# Patient Record
Sex: FEMALE | Race: WHITE | NOT HISPANIC OR LATINO | Employment: FULL TIME | URBAN - METROPOLITAN AREA
[De-identification: names, ages, dates, MRNs, and addresses within clinical notes are randomized per-mention and may not be internally consistent; named-entity substitution may affect disease eponyms.]

---

## 2017-01-11 ENCOUNTER — HISTORICAL (OUTPATIENT)
Dept: RADIOLOGY | Facility: HOSPITAL | Age: 47
End: 2017-01-11

## 2017-02-01 ENCOUNTER — HISTORICAL (OUTPATIENT)
Dept: RADIOLOGY | Facility: HOSPITAL | Age: 47
End: 2017-02-01

## 2017-04-11 ENCOUNTER — HISTORICAL (OUTPATIENT)
Dept: LAB | Facility: HOSPITAL | Age: 47
End: 2017-04-11

## 2018-01-10 LAB
INFLUENZA A ANTIGEN, POC: NEGATIVE
INFLUENZA B ANTIGEN, POC: NEGATIVE

## 2018-09-06 ENCOUNTER — HISTORICAL (OUTPATIENT)
Dept: LAB | Facility: HOSPITAL | Age: 48
End: 2018-09-06

## 2018-09-06 LAB
ABS NEUT (OLG): 2.81 X10(3)/MCL (ref 2.1–9.2)
ALBUMIN SERPL-MCNC: 4.4 GM/DL (ref 3.4–5)
ALBUMIN/GLOB SERPL: 1.2 RATIO (ref 1.1–2)
ALP SERPL-CCNC: 105 UNIT/L (ref 38–126)
ALT SERPL-CCNC: 44 UNIT/L (ref 12–78)
AST SERPL-CCNC: 29 UNIT/L (ref 15–37)
BASOPHILS # BLD AUTO: 0 X10(3)/MCL (ref 0–0.2)
BASOPHILS NFR BLD AUTO: 0 %
BILIRUB SERPL-MCNC: 0.4 MG/DL (ref 0.2–1)
BILIRUBIN DIRECT+TOT PNL SERPL-MCNC: 0.1 MG/DL (ref 0–0.5)
BILIRUBIN DIRECT+TOT PNL SERPL-MCNC: 0.3 MG/DL (ref 0–0.8)
BUN SERPL-MCNC: 18 MG/DL (ref 7–18)
CALCIUM SERPL-MCNC: 9.8 MG/DL (ref 8.5–10.1)
CHLORIDE SERPL-SCNC: 101 MMOL/L (ref 98–107)
CHOLEST SERPL-MCNC: 206 MG/DL (ref 0–200)
CHOLEST/HDLC SERPL: 5.6 {RATIO} (ref 0–4)
CO2 SERPL-SCNC: 30 MMOL/L (ref 21–32)
CREAT SERPL-MCNC: 0.68 MG/DL (ref 0.55–1.02)
DEPRECATED CALCIDIOL+CALCIFEROL SERPL-MC: 19.48 NG/ML (ref 30–80)
EOSINOPHIL # BLD AUTO: 0.1 X10(3)/MCL (ref 0–0.9)
EOSINOPHIL NFR BLD AUTO: 2 %
ERYTHROCYTE [DISTWIDTH] IN BLOOD BY AUTOMATED COUNT: 12.6 % (ref 11.5–17)
EST. AVERAGE GLUCOSE BLD GHB EST-MCNC: 105 MG/DL
GLOBULIN SER-MCNC: 3.7 GM/DL (ref 2.4–3.5)
GLUCOSE SERPL-MCNC: 81 MG/DL (ref 74–106)
HBA1C MFR BLD: 5.3 % (ref 4.2–6.3)
HCT VFR BLD AUTO: 44.6 % (ref 37–47)
HDLC SERPL-MCNC: 37 MG/DL (ref 35–60)
HGB BLD-MCNC: 13.9 GM/DL (ref 12–16)
LDLC SERPL CALC-MCNC: 141 MG/DL (ref 0–129)
LYMPHOCYTES # BLD AUTO: 2.2 X10(3)/MCL (ref 0.6–4.6)
LYMPHOCYTES NFR BLD AUTO: 40 %
MCH RBC QN AUTO: 30.4 PG (ref 27–31)
MCHC RBC AUTO-ENTMCNC: 31.2 GM/DL (ref 33–36)
MCV RBC AUTO: 97.6 FL (ref 80–94)
MONOCYTES # BLD AUTO: 0.3 X10(3)/MCL (ref 0.1–1.3)
MONOCYTES NFR BLD AUTO: 6 %
NEUTROPHILS # BLD AUTO: 2.81 X10(3)/MCL (ref 1.4–7.9)
NEUTROPHILS NFR BLD AUTO: 51 %
PLATELET # BLD AUTO: 187 X10(3)/MCL (ref 130–400)
PMV BLD AUTO: 12.1 FL (ref 9.4–12.4)
POTASSIUM SERPL-SCNC: 4.4 MMOL/L (ref 3.5–5.1)
PROT SERPL-MCNC: 8.1 GM/DL (ref 6.4–8.2)
RBC # BLD AUTO: 4.57 X10(6)/MCL (ref 4.2–5.4)
SODIUM SERPL-SCNC: 138 MMOL/L (ref 136–145)
TRIGL SERPL-MCNC: 138 MG/DL (ref 30–150)
TSH SERPL-ACNC: 1.8 MIU/L (ref 0.36–3.74)
VLDLC SERPL CALC-MCNC: 28 MG/DL
WBC # SPEC AUTO: 5.5 X10(3)/MCL (ref 4.5–11.5)

## 2019-09-06 ENCOUNTER — HISTORICAL (OUTPATIENT)
Dept: LAB | Facility: HOSPITAL | Age: 49
End: 2019-09-06

## 2019-09-06 LAB
ABS NEUT (OLG): 2.59 X10(3)/MCL (ref 2.1–9.2)
ALBUMIN SERPL-MCNC: 4.2 GM/DL (ref 3.4–5)
ALBUMIN/GLOB SERPL: 1.4 {RATIO}
ALP SERPL-CCNC: 95 UNIT/L (ref 38–126)
ALT SERPL-CCNC: 25 UNIT/L (ref 12–78)
AST SERPL-CCNC: 12 UNIT/L (ref 15–37)
BASOPHILS # BLD AUTO: 0 X10(3)/MCL (ref 0–0.2)
BASOPHILS NFR BLD AUTO: 1 %
BILIRUB SERPL-MCNC: 0.5 MG/DL (ref 0.2–1)
BILIRUBIN DIRECT+TOT PNL SERPL-MCNC: 0.1 MG/DL (ref 0–0.2)
BILIRUBIN DIRECT+TOT PNL SERPL-MCNC: 0.4 MG/DL (ref 0–0.8)
BUN SERPL-MCNC: 15 MG/DL (ref 7–18)
CALCIUM SERPL-MCNC: 9.9 MG/DL (ref 8.5–10.1)
CHLORIDE SERPL-SCNC: 102 MMOL/L (ref 98–107)
CHOLEST SERPL-MCNC: 184 MG/DL (ref 0–200)
CHOLEST/HDLC SERPL: 5.1 {RATIO} (ref 0–4)
CO2 SERPL-SCNC: 32 MMOL/L (ref 21–32)
CREAT SERPL-MCNC: 0.64 MG/DL (ref 0.55–1.02)
DEPRECATED CALCIDIOL+CALCIFEROL SERPL-MC: 32.59 NG/ML (ref 30–80)
EOSINOPHIL # BLD AUTO: 0.1 X10(3)/MCL (ref 0–0.9)
EOSINOPHIL NFR BLD AUTO: 2 %
ERYTHROCYTE [DISTWIDTH] IN BLOOD BY AUTOMATED COUNT: 12.4 % (ref 11.5–17)
GLOBULIN SER-MCNC: 3.1 GM/DL (ref 2.4–3.5)
GLUCOSE SERPL-MCNC: 93 MG/DL (ref 74–106)
HCT VFR BLD AUTO: 43 % (ref 37–47)
HDLC SERPL-MCNC: 36 MG/DL (ref 35–60)
HGB BLD-MCNC: 13.5 GM/DL (ref 12–16)
LDLC SERPL CALC-MCNC: 130 MG/DL (ref 0–129)
LYMPHOCYTES # BLD AUTO: 2.1 X10(3)/MCL (ref 0.6–4.6)
LYMPHOCYTES NFR BLD AUTO: 40 %
MCH RBC QN AUTO: 29.9 PG (ref 27–31)
MCHC RBC AUTO-ENTMCNC: 31.4 GM/DL (ref 33–36)
MCV RBC AUTO: 95.1 FL (ref 80–94)
MONOCYTES # BLD AUTO: 0.4 X10(3)/MCL (ref 0.1–1.3)
MONOCYTES NFR BLD AUTO: 7 %
NEUTROPHILS # BLD AUTO: 2.59 X10(3)/MCL (ref 2.1–9.2)
NEUTROPHILS NFR BLD AUTO: 50 %
PLATELET # BLD AUTO: 182 X10(3)/MCL (ref 130–400)
PMV BLD AUTO: 12.1 FL (ref 9.4–12.4)
POTASSIUM SERPL-SCNC: 4.1 MMOL/L (ref 3.5–5.1)
PROT SERPL-MCNC: 7.3 GM/DL (ref 6.4–8.2)
RBC # BLD AUTO: 4.52 X10(6)/MCL (ref 4.2–5.4)
SODIUM SERPL-SCNC: 139 MMOL/L (ref 136–145)
TRIGL SERPL-MCNC: 90 MG/DL (ref 30–150)
TSH SERPL-ACNC: 1.51 MIU/L (ref 0.36–3.74)
VLDLC SERPL CALC-MCNC: 18 MG/DL
WBC # SPEC AUTO: 5.2 X10(3)/MCL (ref 4.5–11.5)

## 2019-09-24 ENCOUNTER — HISTORICAL (OUTPATIENT)
Dept: RADIOLOGY | Facility: HOSPITAL | Age: 49
End: 2019-09-24

## 2019-10-10 ENCOUNTER — HISTORICAL (OUTPATIENT)
Dept: RADIOLOGY | Facility: HOSPITAL | Age: 49
End: 2019-10-10

## 2019-10-17 ENCOUNTER — HISTORICAL (OUTPATIENT)
Dept: RADIOLOGY | Facility: HOSPITAL | Age: 49
End: 2019-10-17

## 2020-09-28 ENCOUNTER — HISTORICAL (OUTPATIENT)
Dept: RADIOLOGY | Facility: HOSPITAL | Age: 50
End: 2020-09-28

## 2020-10-29 ENCOUNTER — HISTORICAL (OUTPATIENT)
Dept: ADMINISTRATIVE | Facility: HOSPITAL | Age: 50
End: 2020-10-29

## 2020-10-29 LAB
ABS NEUT (OLG): 2.41 X10(3)/MCL (ref 2.1–9.2)
ALBUMIN SERPL-MCNC: 4.5 GM/DL (ref 3.5–5)
ALBUMIN/GLOB SERPL: 1.5 RATIO (ref 1.1–2)
ALP SERPL-CCNC: 93 UNIT/L (ref 40–150)
ALT SERPL-CCNC: 16 UNIT/L (ref 0–55)
AST SERPL-CCNC: 14 UNIT/L (ref 5–34)
BASOPHILS # BLD AUTO: 0 X10(3)/MCL (ref 0–0.2)
BASOPHILS NFR BLD AUTO: 1 %
BILIRUB SERPL-MCNC: 0.6 MG/DL
BILIRUBIN DIRECT+TOT PNL SERPL-MCNC: 0.2 MG/DL (ref 0–0.5)
BILIRUBIN DIRECT+TOT PNL SERPL-MCNC: 0.4 MG/DL (ref 0–0.8)
BUN SERPL-MCNC: 14.6 MG/DL (ref 9.8–20.1)
CALCIUM SERPL-MCNC: 9.4 MG/DL (ref 8.4–10.2)
CHLORIDE SERPL-SCNC: 100 MMOL/L (ref 98–107)
CHOLEST SERPL-MCNC: 187 MG/DL
CHOLEST/HDLC SERPL: 4 {RATIO} (ref 0–5)
CO2 SERPL-SCNC: 29 MMOL/L (ref 22–29)
CREAT SERPL-MCNC: 0.64 MG/DL (ref 0.55–1.02)
DEPRECATED CALCIDIOL+CALCIFEROL SERPL-MC: 33.6 NG/ML (ref 6.6–49.9)
EOSINOPHIL # BLD AUTO: 0.1 X10(3)/MCL (ref 0–0.9)
EOSINOPHIL NFR BLD AUTO: 2 %
ERYTHROCYTE [DISTWIDTH] IN BLOOD BY AUTOMATED COUNT: 12.1 % (ref 11.5–17)
GLOBULIN SER-MCNC: 3 GM/DL (ref 2.4–3.5)
GLUCOSE SERPL-MCNC: 92 MG/DL (ref 74–100)
HCT VFR BLD AUTO: 41.7 % (ref 37–47)
HDLC SERPL-MCNC: 44 MG/DL (ref 35–60)
HGB BLD-MCNC: 13.1 GM/DL (ref 12–16)
LDLC SERPL CALC-MCNC: 131 MG/DL (ref 50–140)
LYMPHOCYTES # BLD AUTO: 2 X10(3)/MCL (ref 0.6–4.6)
LYMPHOCYTES NFR BLD AUTO: 41 %
MAGNESIUM SERPL-MCNC: 1.9 MG/DL (ref 1.6–2.6)
MCH RBC QN AUTO: 30.6 PG (ref 27–31)
MCHC RBC AUTO-ENTMCNC: 31.4 GM/DL (ref 33–36)
MCV RBC AUTO: 97.4 FL (ref 80–94)
MONOCYTES # BLD AUTO: 0.3 X10(3)/MCL (ref 0.1–1.3)
MONOCYTES NFR BLD AUTO: 6 %
NEUTROPHILS # BLD AUTO: 2.41 X10(3)/MCL (ref 2.1–9.2)
NEUTROPHILS NFR BLD AUTO: 50 %
PLATELET # BLD AUTO: 184 X10(3)/MCL (ref 130–400)
PMV BLD AUTO: 11.8 FL (ref 9.4–12.4)
POTASSIUM SERPL-SCNC: 3.8 MMOL/L (ref 3.5–5.1)
PROT SERPL-MCNC: 7.5 GM/DL (ref 6.4–8.3)
RBC # BLD AUTO: 4.28 X10(6)/MCL (ref 4.2–5.4)
SODIUM SERPL-SCNC: 141 MMOL/L (ref 136–145)
TRIGL SERPL-MCNC: 62 MG/DL (ref 37–140)
TSH SERPL-ACNC: 1.08 UIU/ML (ref 0.35–4.94)
VLDLC SERPL CALC-MCNC: 12 MG/DL
WBC # SPEC AUTO: 4.8 X10(3)/MCL (ref 4.5–11.5)

## 2021-01-04 ENCOUNTER — HISTORICAL (OUTPATIENT)
Dept: LAB | Facility: HOSPITAL | Age: 51
End: 2021-01-04

## 2021-01-07 ENCOUNTER — HISTORICAL (OUTPATIENT)
Dept: MEDSURG UNIT | Facility: HOSPITAL | Age: 51
End: 2021-01-07

## 2021-01-07 LAB — CRC RECOMMENDATION EXT: NORMAL

## 2021-03-29 ENCOUNTER — HISTORICAL (OUTPATIENT)
Dept: RADIOLOGY | Facility: HOSPITAL | Age: 51
End: 2021-03-29

## 2021-04-12 ENCOUNTER — HISTORICAL (OUTPATIENT)
Dept: CARDIOLOGY | Facility: HOSPITAL | Age: 51
End: 2021-04-12

## 2021-09-29 ENCOUNTER — HISTORICAL (OUTPATIENT)
Dept: RADIOLOGY | Facility: HOSPITAL | Age: 51
End: 2021-09-29

## 2021-12-31 ENCOUNTER — HISTORICAL (OUTPATIENT)
Dept: LAB | Facility: HOSPITAL | Age: 51
End: 2021-12-31

## 2021-12-31 LAB
ABS NEUT (OLG): 4.09 X10(3)/MCL (ref 2.1–9.2)
ALBUMIN SERPL-MCNC: 4 GM/DL (ref 3.5–5)
ALBUMIN/GLOB SERPL: 1.3 RATIO (ref 1.1–2)
ALP SERPL-CCNC: 92 UNIT/L (ref 40–150)
ALT SERPL-CCNC: 49 UNIT/L (ref 0–55)
APPEARANCE, UA: CLEAR
AST SERPL-CCNC: 37 UNIT/L (ref 5–34)
BASOPHILS # BLD AUTO: 0.04 X10(3)/MCL (ref 0–0.2)
BASOPHILS NFR BLD AUTO: 0.6 % (ref 0–1)
BILIRUB SERPL-MCNC: 0.5 MG/DL (ref 0.2–1.2)
BILIRUB UR QL STRIP: NEGATIVE
BILIRUBIN DIRECT+TOT PNL SERPL-MCNC: 0.2 MG/DL (ref 0–0.5)
BILIRUBIN DIRECT+TOT PNL SERPL-MCNC: 0.3 MG/DL (ref 0–0.8)
BUN SERPL-MCNC: 11 MG/DL (ref 9.8–20.1)
CALCIUM SERPL-MCNC: 9.8 MG/DL (ref 8.4–10.2)
CHLORIDE SERPL-SCNC: 104 MMOL/L (ref 98–107)
CHOLEST SERPL-MCNC: 208 MG/DL
CHOLEST/HDLC SERPL: 5 {RATIO} (ref 0–5)
CO2 SERPL-SCNC: 31 MMOL/L (ref 22–29)
COLOR UR: YELLOW
CREAT SERPL-MCNC: 0.69 MG/DL (ref 0.57–1.11)
DEPRECATED CALCIDIOL+CALCIFEROL SERPL-MC: 17.1 NG/ML (ref 30–80)
EOSINOPHIL # BLD AUTO: 0.18 X10(3)/MCL (ref 0–0.9)
EOSINOPHIL NFR BLD AUTO: 2.5 % (ref 0–6.4)
ERYTHROCYTE [DISTWIDTH] IN BLOOD BY AUTOMATED COUNT: 12.4 % (ref 11.5–17)
EST. AVERAGE GLUCOSE BLD GHB EST-MCNC: 116.9 MG/DL
GLOBULIN SER-MCNC: 3 GM/DL (ref 2.4–3.5)
GLUCOSE (UA): NEGATIVE
GLUCOSE SERPL-MCNC: 88 MG/DL (ref 74–100)
HBA1C MFR BLD: 5.7 %
HCT VFR BLD AUTO: 42.1 % (ref 37–47)
HDLC SERPL-MCNC: 39 MG/DL (ref 40–60)
HGB BLD-MCNC: 13.6 GM/DL (ref 12–16)
HGB UR QL STRIP: NEGATIVE
IMM GRANULOCYTES # BLD AUTO: 0.03 10*3/UL (ref 0–0.02)
IMM GRANULOCYTES NFR BLD AUTO: 0.4 % (ref 0–0.43)
KETONES UR QL STRIP: NEGATIVE
LDLC SERPL CALC-MCNC: 147 MG/DL (ref 50–140)
LEUKOCYTE ESTERASE UR QL STRIP: NEGATIVE
LYMPHOCYTES # BLD AUTO: 2.48 X10(3)/MCL (ref 0.6–4.6)
LYMPHOCYTES NFR BLD AUTO: 34.5 % (ref 16–44)
MCH RBC QN AUTO: 30.6 PG (ref 27–31)
MCHC RBC AUTO-ENTMCNC: 32.3 GM/DL (ref 33–36)
MCV RBC AUTO: 94.8 FL (ref 80–94)
MONOCYTES # BLD AUTO: 0.36 X10(3)/MCL (ref 0.1–1.3)
MONOCYTES NFR BLD AUTO: 5 % (ref 4–12.1)
NEUTROPHILS # BLD AUTO: 4.09 X10(3)/MCL (ref 2.1–9.2)
NEUTROPHILS NFR BLD AUTO: 57 % (ref 43–73)
NITRITE UR QL STRIP: NEGATIVE
NRBC BLD AUTO-RTO: 0 % (ref 0–0.2)
PH UR STRIP: 7 [PH] (ref 5–7)
PLATELET # BLD AUTO: 214 X10(3)/MCL (ref 130–400)
PMV BLD AUTO: 10.1 FL (ref 7.4–10.4)
POTASSIUM SERPL-SCNC: 4.1 MMOL/L (ref 3.5–5.1)
PROT SERPL-MCNC: 7 GM/DL (ref 6.4–8.3)
PROT UR QL STRIP: NEGATIVE
RBC # BLD AUTO: 4.44 X10(6)/MCL (ref 4.2–5.4)
SODIUM SERPL-SCNC: 143 MMOL/L (ref 136–145)
SP GR UR STRIP: 1.02 (ref 1–1.03)
TRIGL SERPL-MCNC: 108 MG/DL (ref 0–150)
TSH SERPL-ACNC: 1.12 UIU/ML (ref 0.35–4.94)
UROBILINOGEN UR STRIP-ACNC: NEGATIVE
VLDLC SERPL CALC-MCNC: 22 MG/DL
WBC # SPEC AUTO: 7.2 X10(3)/MCL (ref 4.5–11.5)

## 2022-04-10 ENCOUNTER — HISTORICAL (OUTPATIENT)
Dept: ADMINISTRATIVE | Facility: HOSPITAL | Age: 52
End: 2022-04-10
Payer: COMMERCIAL

## 2022-04-28 VITALS
DIASTOLIC BLOOD PRESSURE: 78 MMHG | HEIGHT: 65 IN | BODY MASS INDEX: 29.38 KG/M2 | WEIGHT: 176.38 LBS | OXYGEN SATURATION: 98 % | SYSTOLIC BLOOD PRESSURE: 120 MMHG

## 2022-06-28 ENCOUNTER — OFFICE VISIT (OUTPATIENT)
Dept: FAMILY MEDICINE | Facility: CLINIC | Age: 52
End: 2022-06-28
Payer: COMMERCIAL

## 2022-06-28 VITALS
RESPIRATION RATE: 17 BRPM | OXYGEN SATURATION: 97 % | DIASTOLIC BLOOD PRESSURE: 82 MMHG | SYSTOLIC BLOOD PRESSURE: 110 MMHG | BODY MASS INDEX: 30.26 KG/M2 | TEMPERATURE: 98 F | WEIGHT: 181.63 LBS | HEART RATE: 63 BPM

## 2022-06-28 DIAGNOSIS — E55.9 VITAMIN D DEFICIENCY: ICD-10-CM

## 2022-06-28 DIAGNOSIS — Z00.00 ANNUAL PHYSICAL EXAM: ICD-10-CM

## 2022-06-28 DIAGNOSIS — F32.A DEPRESSIVE DISORDER: Primary | ICD-10-CM

## 2022-06-28 DIAGNOSIS — E66.9 OBESITY, UNSPECIFIED CLASSIFICATION, UNSPECIFIED OBESITY TYPE, UNSPECIFIED WHETHER SERIOUS COMORBIDITY PRESENT: ICD-10-CM

## 2022-06-28 DIAGNOSIS — M79.7 FIBROMYALGIA: ICD-10-CM

## 2022-06-28 DIAGNOSIS — Z12.31 BREAST CANCER SCREENING BY MAMMOGRAM: ICD-10-CM

## 2022-06-28 PROBLEM — Z80.0 FAMILY HISTORY OF COLON CANCER: Status: ACTIVE | Noted: 2022-06-28

## 2022-06-28 PROCEDURE — 3008F BODY MASS INDEX DOCD: CPT | Mod: CPTII,,, | Performed by: FAMILY MEDICINE

## 2022-06-28 PROCEDURE — 3079F DIAST BP 80-89 MM HG: CPT | Mod: CPTII,,, | Performed by: FAMILY MEDICINE

## 2022-06-28 PROCEDURE — 3008F PR BODY MASS INDEX (BMI) DOCUMENTED: ICD-10-PCS | Mod: CPTII,,, | Performed by: FAMILY MEDICINE

## 2022-06-28 PROCEDURE — 99214 OFFICE O/P EST MOD 30 MIN: CPT | Mod: ,,, | Performed by: FAMILY MEDICINE

## 2022-06-28 PROCEDURE — 3074F PR MOST RECENT SYSTOLIC BLOOD PRESSURE < 130 MM HG: ICD-10-PCS | Mod: CPTII,,, | Performed by: FAMILY MEDICINE

## 2022-06-28 PROCEDURE — 3074F SYST BP LT 130 MM HG: CPT | Mod: CPTII,,, | Performed by: FAMILY MEDICINE

## 2022-06-28 PROCEDURE — 99214 PR OFFICE/OUTPT VISIT, EST, LEVL IV, 30-39 MIN: ICD-10-PCS | Mod: ,,, | Performed by: FAMILY MEDICINE

## 2022-06-28 PROCEDURE — 3079F PR MOST RECENT DIASTOLIC BLOOD PRESSURE 80-89 MM HG: ICD-10-PCS | Mod: CPTII,,, | Performed by: FAMILY MEDICINE

## 2022-06-28 RX ORDER — TRIAMCINOLONE ACETONIDE 1 MG/G
CREAM TOPICAL 2 TIMES DAILY
Qty: 80 G | Refills: 2 | Status: SHIPPED | OUTPATIENT
Start: 2022-06-28 | End: 2022-06-28 | Stop reason: SDUPTHER

## 2022-06-28 RX ORDER — METHYLPREDNISOLONE 4 MG/1
TABLET ORAL
Qty: 1 EACH | Refills: 0 | Status: SHIPPED | OUTPATIENT
Start: 2022-06-28 | End: 2023-01-05 | Stop reason: SDUPTHER

## 2022-06-28 RX ORDER — FLUTICASONE PROPIONATE 50 MCG
1 SPRAY, SUSPENSION (ML) NASAL 2 TIMES DAILY
COMMUNITY
Start: 2022-04-18 | End: 2022-07-18

## 2022-06-28 RX ORDER — GABAPENTIN 300 MG/1
300 CAPSULE ORAL
COMMUNITY
Start: 2022-01-19 | End: 2022-11-01 | Stop reason: SDUPTHER

## 2022-06-28 RX ORDER — TRIAMCINOLONE ACETONIDE 1 MG/G
CREAM TOPICAL 2 TIMES DAILY
Qty: 80 G | Refills: 2 | Status: SHIPPED | OUTPATIENT
Start: 2022-06-28 | End: 2023-01-05 | Stop reason: SDUPTHER

## 2022-06-28 RX ORDER — FLUOXETINE HYDROCHLORIDE 40 MG/1
40 CAPSULE ORAL DAILY
COMMUNITY
Start: 2022-03-22 | End: 2022-06-28 | Stop reason: SDUPTHER

## 2022-06-28 RX ORDER — FLUOXETINE HYDROCHLORIDE 40 MG/1
40 CAPSULE ORAL DAILY
Qty: 90 CAPSULE | Refills: 3 | Status: SHIPPED | OUTPATIENT
Start: 2022-06-28 | End: 2023-02-16 | Stop reason: SDUPTHER

## 2022-06-28 RX ORDER — ASPIRIN 325 MG
50000 TABLET, DELAYED RELEASE (ENTERIC COATED) ORAL
COMMUNITY
Start: 2022-01-03

## 2022-06-28 NOTE — PROGRESS NOTES
Subjective:        Patient ID: Lida Walker is a 51 y.o. female.    Chief Complaint: Follow-up (Prozac refill)      Patient presents to the clinic unaccompanied for follow-up.  She is due for her wellness visit in December.    She has a history of depression fibromyalgia.  She is currently on Prozac 40 mg daily.  It is working well.  She is asking for refill.  She is seeing a counselor.  She is also on gabapentin.    Her vitamin-D has been low in the past.  She is currently supplementing.    She reports a rash related to sun exposure.  She uses triamcinolone topical cream and Medrol Dosepaks which is helpful.  She does not currently have the rash.    She has had a hysterectomy due to fibroids in 2008 with Dr. Stevens in [place].  Last mammogram 9/2021 was normal. She had colon cancer screening in January 2021 with Dr. gonzalez      +family history of colon cancer in brother (Dx age 57)    Review of Systems   Constitutional: Negative.    HENT: Negative.    Respiratory: Negative.    Cardiovascular: Negative.    Gastrointestinal: Negative.    Genitourinary: Negative.          Review of patient's allergies indicates:   Allergen Reactions    Lortab [hydrocodone-acetaminophen]       Vitals:    06/28/22 0812   BP: 110/82   BP Location: Left arm   Pulse: 63   Resp: 17   Temp: 97.9 °F (36.6 °C)   SpO2: 97%   Weight: 82.4 kg (181 lb 9.6 oz)      Social History     Socioeconomic History    Marital status:    Tobacco Use    Smoking status: Never Smoker    Smokeless tobacco: Never Used   Substance and Sexual Activity    Alcohol use: Yes    Drug use: No    Sexual activity: Not Currently      History reviewed. No pertinent family history.       Objective:     Physical Exam  Vitals and nursing note reviewed.   Constitutional:       Appearance: Normal appearance. She is obese.   HENT:      Head: Normocephalic and atraumatic.      Nose: Nose normal.      Mouth/Throat:      Mouth: Mucous membranes are moist.       Pharynx: Oropharynx is clear.   Eyes:      Extraocular Movements: Extraocular movements intact.   Cardiovascular:      Rate and Rhythm: Normal rate and regular rhythm.      Pulses: Normal pulses.      Heart sounds: Normal heart sounds.   Pulmonary:      Effort: Pulmonary effort is normal.      Breath sounds: Normal breath sounds.   Musculoskeletal:         General: Normal range of motion.      Cervical back: Normal range of motion.   Skin:     General: Skin is warm and dry.   Neurological:      General: No focal deficit present.      Mental Status: She is alert and oriented to person, place, and time. Mental status is at baseline.   Psychiatric:         Mood and Affect: Mood normal.       Current Outpatient Medications on File Prior to Visit   Medication Sig Dispense Refill    gabapentin (NEURONTIN) 300 MG capsule Take 300 mg by mouth.      cholecalciferol, vitamin D3, 1,250 mcg (50,000 unit) capsule Take 50,000 Units by mouth every 7 days.      fluticasone propionate (FLONASE) 50 mcg/actuation nasal spray 1 spray by Each Nostril route 2 (two) times daily. PRN      polyethylene glycol (GLYCOLAX) 17 gram PwPk Take 17 g by mouth once daily. 10 each 0    [DISCONTINUED] fluoxetine (PROZAC) 20 MG capsule Take 1 capsule (20 mg total) by mouth once daily. 30 capsule 0    [DISCONTINUED] FLUoxetine 40 MG capsule Take 40 mg by mouth once daily.      [DISCONTINUED] gabapentin (NEURONTIN) 100 MG capsule Take 1 capsule (100 mg total) by mouth every evening. 30 capsule 3    [DISCONTINUED] triamcinolone acetonide 0.1% (KENALOG) 0.1 % cream Apply topically 2 (two) times daily. 80 g 2     No current facility-administered medications on file prior to visit.     Health Maintenance   Topic Date Due    Hepatitis C Screening  Never done    TETANUS VACCINE  Never done    Mammogram  09/29/2022    Lipid Panel  12/31/2022      Results for orders placed or performed in visit on 12/31/21   Comprehensive Metabolic Panel   Result  Value Ref Range    Albumin/Globulin Ratio 1.3 1.1 - 2.0 ratio    Alanine Aminotransferase 49 0 - 55 unit/L    Albumin Level 4.0 3.5 - 5.0 gm/dL    Alkaline Phosphatase 92 40 - 150 unit/L    Aspartate Aminotransferase 37 (H) 5 - 34 unit/L    Bilirubin Direct 0.2 0.0 - 0.5 mg/dL    Bilirubin Indirect 0.30 0.00 - 0.80 mg/dL    Bilirubin Total 0.5 0.2 - 1.2 mg/dL    Blood Urea Nitrogen 11.0 9.8 - 20.1 mg/dL    Carbon Dioxide 31 (H) 22 - 29 mmol/L    Chloride 104 98 - 107 mmol/L    Calcium Level Total 9.8 8.4 - 10.2 mg/dL    Creatinine 0.69 0.57 - 1.11 mg/dL    Glucose Level 88 74 - 100 mg/dL    Globulin 3.0 2.4 - 3.5 gm/dL    Potassium Level 4.1 3.5 - 5.1 mmol/L    Sodium Level 143 136 - 145 mmol/L    Protein Total 7.0 6.4 - 8.3 gm/dL   Lipid Panel   Result Value Ref Range    Cholesterol Total 208 (H) <<=200 mg/dL    Cholesterol/HDL Ratio 5 0 - 5    HDL Cholesterol 39 (L) 40 - 60 mg/dL    LDL Cholesterol 147.00 (H) 50.00 - 140.00 mg/dL    Triglyceride 108 0 - 150 mg/dL    Very Low Density Lipoprotein 22    Hemoglobin A1C   Result Value Ref Range    Estimated Average Glucose 116.9 mg/dL    Hemoglobin A1c 5.7 <<=7.0 %   TSH   Result Value Ref Range    Thyroid Stimulating Hormone 1.1218 0.3500 - 4.9400 uIU/mL   Vitamin D   Result Value Ref Range    Vit D 25 OH 17.1 (L) 30.0 - 80.0 ng/mL   GFR, Estimated   Result Value Ref Range    Estimated GFR-Non African American >60 mL/min/1.73 m2    Estimated GFR- >60    Differential   Result Value Ref Range    Basophil % 0.6 0.0 - 1.0 %    Neut # 4.09 2.10 - 9.20 x10(3)/mcL    Lymph # 2.48 0.60 - 4.60 x10(3)/mcL    Mono # 0.36 0.10 - 1.30 x10(3)/mcL    Eos # 0.18 0.00 - 0.90 x10(3)/mcL    Baso # 0.04 0.00 - 0.20 x10(3)/mcL    Neut % 57.0 43.0 - 73.0 %    IG# 0.0300 (H) 0.0000 - 0.0155    Lymph % 34.5 16.0 - 44.0 %    IG% 0.400 0.000 - 0.430 %    Mono % 5.0 4.0 - 12.1 %    NRBC% 0.0 0.0 - 0.2 %    Eos % 2.5 0.0 - 6.4 %   CBC Auto Differential   Result Value Ref Range     Abs Neut 4.09 2.10 - 9.20 x10(3)/mcL    Hct 42.1 37.0 - 47.0 %    MCH 30.6 27.0 - 31.0 pg    MCHC 32.3 (L) 33.0 - 36.0 gm/dL    MPV 10.1 7.4 - 10.4 fL    Hgb 13.6 12.0 - 16.0 gm/dL    MCV 94.8 (H) 80.0 - 94.0 fL    RDW 12.4 11.5 - 17.0 %    Platelet 214 130 - 400 x10(3)/mcL    WBC 7.2 4.5 - 11.5 x10(3)/mcL    RBC 4.44 4.20 - 5.40 x10(6)/mcL   Urinalysis, Reflex to Urine Culture    Specimen: Urine   Result Value Ref Range    pH, UA 7.0 5.0 - 7.0    Ketones, UA Negative >Negative    Leukocytes, UA Negative >Negative    Urobilinogen, UA Negative >Negative    Color, UA YELLOW >YELLOW    Nitrite, UA Negative >Negative    Bilirubin (UA) Negative >Negative    Appearance, UA CLEAR >CLEAR    Protein, UA Negative >Negative    Occult Blood UA Negative >Negative    Specific Gravity,UA 1.020 1.005 - 1.030    Glucose, UA Negative >Negative          Assessment & Plan:     Active Problem List with Overview Notes    Diagnosis Date Noted    Depressive disorder 06/28/2022    Breast cancer screening by mammogram 06/28/2022    Obesity 06/28/2022    Vitamin D deficiency 06/28/2022    Family history of colon cancer 06/28/2022    Fibromyalgia 04/09/2013       1. Depressive disorder  Assessment & Plan:  Stable on current meds    Orders:  -     CBC Auto Differential  -     Comprehensive Metabolic Panel  -     HIV 1/2 Ag/Ab (4th Gen)  -     Hepatitis C Antibody  -     Lipid Panel  -     TSH  -     Vitamin D    2. Obesity, unspecified classification, unspecified obesity type, unspecified whether serious comorbidity present  Assessment & Plan:  Encourage lifestyle change    Orders:  -     CBC Auto Differential  -     Comprehensive Metabolic Panel  -     HIV 1/2 Ag/Ab (4th Gen)  -     Hepatitis C Antibody  -     Lipid Panel  -     TSH  -     Vitamin D    3. Vitamin D deficiency  Assessment & Plan:  Continue to supplement otc. Will check with wellness labs    Orders:  -     CBC Auto Differential  -     Comprehensive Metabolic Panel  -      HIV 1/2 Ag/Ab (4th Gen)  -     Hepatitis C Antibody  -     Lipid Panel  -     TSH  -     Vitamin D    4. Fibromyalgia  Assessment & Plan:  Stable on current meds.     Orders:  -     CBC Auto Differential  -     Comprehensive Metabolic Panel  -     HIV 1/2 Ag/Ab (4th Gen)  -     Hepatitis C Antibody  -     Lipid Panel  -     TSH  -     Vitamin D    5. Annual physical exam  -     CBC Auto Differential  -     Comprehensive Metabolic Panel  -     HIV 1/2 Ag/Ab (4th Gen)  -     Hepatitis C Antibody  -     Lipid Panel  -     TSH  -     Vitamin D    6. Breast cancer screening by mammogram  Assessment & Plan:  Mammogram due 9/2022. Order placed    Orders:  -     Mammo Digital Screening Bilat w/ [facility]    Other orders  -     FLUoxetine 40 MG capsule  -     methylPREDNISolone (MEDROL DOSEPACK) 4 mg tablet  -     triamcinolone acetonide 0.1% (KENALOG) 0.1 % cream       Follow up in about 6 months (around 12/28/2022) for wellness with labs.

## 2022-09-21 ENCOUNTER — HISTORICAL (OUTPATIENT)
Dept: ADMINISTRATIVE | Facility: HOSPITAL | Age: 52
End: 2022-09-21
Payer: COMMERCIAL

## 2022-10-03 ENCOUNTER — HOSPITAL ENCOUNTER (OUTPATIENT)
Dept: RADIOLOGY | Facility: HOSPITAL | Age: 52
Discharge: HOME | End: 2022-10-03
Attending: FAMILY MEDICINE
Payer: COMMERCIAL

## 2022-10-03 DIAGNOSIS — Z12.31 BREAST CANCER SCREENING BY MAMMOGRAM: ICD-10-CM

## 2022-10-03 PROCEDURE — 77067 MAMMO DIGITAL SCREENING BILAT WITH TOMO: ICD-10-PCS | Mod: 26,,, | Performed by: RADIOLOGY

## 2022-10-03 PROCEDURE — 77067 SCR MAMMO BI INCL CAD: CPT | Mod: TC

## 2022-10-03 PROCEDURE — 77063 BREAST TOMOSYNTHESIS BI: CPT | Mod: 26,,, | Performed by: RADIOLOGY

## 2022-10-03 PROCEDURE — 77063 MAMMO DIGITAL SCREENING BILAT WITH TOMO: ICD-10-PCS | Mod: 26,,, | Performed by: RADIOLOGY

## 2022-10-03 PROCEDURE — 77067 SCR MAMMO BI INCL CAD: CPT | Mod: 26,,, | Performed by: RADIOLOGY

## 2022-11-01 RX ORDER — GABAPENTIN 300 MG/1
300 CAPSULE ORAL NIGHTLY
Qty: 30 CAPSULE | Refills: 8 | Status: SHIPPED | OUTPATIENT
Start: 2022-11-01 | End: 2023-07-29

## 2022-11-01 NOTE — TELEPHONE ENCOUNTER
----- Message from Sandra Herrera sent at 11/1/2022  1:52 PM CDT -----  Regarding: refill  Type:  RX Refill Request    Who Called: pt   Refill or New Rx:refill  RX Name and Strength:gabapentin (NEURONTIN) 300 MG capsule  How is the patient currently taking it? (ex. 1XDay):1 day  Is this a 30 day or 90 day RX:30  Preferred Pharmacy with phone number:[facility] [place]  Local or Mail Order:local  Ordering Provider:emerald gagnon  Would the patient rather a call back or a response via [facility]? C/b  Best Call Back Number:[phone]  Additional Information: pt is all out of medication, pharmacy contacted pt stated they sent request 2x with no response

## 2022-11-28 ENCOUNTER — DOCUMENTATION ONLY (OUTPATIENT)
Dept: ADMINISTRATIVE | Facility: HOSPITAL | Age: 52
End: 2022-11-28
Payer: COMMERCIAL

## 2023-01-02 ENCOUNTER — LAB VISIT (OUTPATIENT)
Dept: LAB | Facility: HOSPITAL | Age: 53
End: 2023-01-02
Attending: FAMILY MEDICINE
Payer: COMMERCIAL

## 2023-01-02 DIAGNOSIS — E55.9 VITAMIN D DEFICIENCY: ICD-10-CM

## 2023-01-02 DIAGNOSIS — M79.7 FIBROMYALGIA: ICD-10-CM

## 2023-01-02 DIAGNOSIS — E66.9 OBESITY, UNSPECIFIED CLASSIFICATION, UNSPECIFIED OBESITY TYPE, UNSPECIFIED WHETHER SERIOUS COMORBIDITY PRESENT: ICD-10-CM

## 2023-01-02 DIAGNOSIS — Z00.00 ANNUAL PHYSICAL EXAM: ICD-10-CM

## 2023-01-02 DIAGNOSIS — F32.A DEPRESSIVE DISORDER: ICD-10-CM

## 2023-01-02 LAB
ALBUMIN SERPL-MCNC: 4 G/DL (ref 3.5–5)
ALBUMIN/GLOB SERPL: 1.3 RATIO (ref 1.1–2)
ALP SERPL-CCNC: 87 UNIT/L (ref 40–150)
ALT SERPL-CCNC: 24 UNIT/L (ref 0–55)
AST SERPL-CCNC: 19 UNIT/L (ref 5–34)
BASOPHILS # BLD AUTO: 0.03 X10(3)/MCL (ref 0–0.2)
BASOPHILS NFR BLD AUTO: 0.6 %
BILIRUBIN DIRECT+TOT PNL SERPL-MCNC: 0.5 MG/DL
BUN SERPL-MCNC: 12.9 MG/DL (ref 9.8–20.1)
CALCIUM SERPL-MCNC: 9.4 MG/DL (ref 8.4–10.2)
CHLORIDE SERPL-SCNC: 107 MMOL/L (ref 98–107)
CHOLEST SERPL-MCNC: 203 MG/DL
CHOLEST/HDLC SERPL: 6 {RATIO} (ref 0–5)
CO2 SERPL-SCNC: 28 MMOL/L (ref 22–29)
CREAT SERPL-MCNC: 0.74 MG/DL (ref 0.55–1.02)
DEPRECATED CALCIDIOL+CALCIFEROL SERPL-MC: 29.8 NG/ML (ref 30–80)
EOSINOPHIL # BLD AUTO: 0.12 X10(3)/MCL (ref 0–0.9)
EOSINOPHIL NFR BLD AUTO: 2.6 %
ERYTHROCYTE [DISTWIDTH] IN BLOOD BY AUTOMATED COUNT: 12.8 % (ref 11–14.5)
GFR SERPLBLD CREATININE-BSD FMLA CKD-EPI: >60 MLS/MIN/1.73/M2
GLOBULIN SER-MCNC: 3.2 GM/DL (ref 2.4–3.5)
GLUCOSE SERPL-MCNC: 105 MG/DL (ref 74–100)
HCT VFR BLD AUTO: 40.9 % (ref 37–47)
HCV AB SERPL QL IA: NONREACTIVE
HDLC SERPL-MCNC: 36 MG/DL (ref 35–60)
HGB BLD-MCNC: 13.4 GM/DL (ref 12–16)
HIV 1+2 AB+HIV1 P24 AG SERPL QL IA: NONREACTIVE
IMM GRANULOCYTES # BLD AUTO: 0.01 X10(3)/MCL (ref 0–0.04)
IMM GRANULOCYTES NFR BLD AUTO: 0.2 %
LDLC SERPL CALC-MCNC: 139 MG/DL (ref 50–140)
LYMPHOCYTES # BLD AUTO: 1.53 X10(3)/MCL (ref 0.6–4.6)
LYMPHOCYTES NFR BLD AUTO: 33 %
MCH RBC QN AUTO: 30.6 PG
MCHC RBC AUTO-ENTMCNC: 32.8 MG/DL (ref 33–36)
MCV RBC AUTO: 93.4 FL (ref 80–94)
MONOCYTES # BLD AUTO: 0.32 X10(3)/MCL (ref 0.1–1.3)
MONOCYTES NFR BLD AUTO: 6.9 %
NEUTROPHILS # BLD AUTO: 2.62 X10(3)/MCL (ref 2.1–9.2)
NEUTROPHILS NFR BLD AUTO: 56.7 %
NRBC BLD AUTO-RTO: 0 % (ref 0–1)
PLATELET # BLD AUTO: 179 X10(3)/MCL (ref 140–371)
PMV BLD AUTO: 10.8 FL (ref 9.4–12.4)
POTASSIUM SERPL-SCNC: 3.8 MMOL/L (ref 3.5–5.1)
PROT SERPL-MCNC: 7.2 GM/DL (ref 6.4–8.3)
RBC # BLD AUTO: 4.38 X10(6)/MCL (ref 4.2–5.4)
SODIUM SERPL-SCNC: 141 MMOL/L (ref 136–145)
TRIGL SERPL-MCNC: 138 MG/DL (ref 37–140)
TSH SERPL-ACNC: 1.06 UIU/ML (ref 0.35–4.94)
VLDLC SERPL CALC-MCNC: 28 MG/DL
WBC # SPEC AUTO: 4.6 X10(3)/MCL (ref 4.5–11.5)

## 2023-01-02 PROCEDURE — 80053 COMPREHEN METABOLIC PANEL: CPT

## 2023-01-02 PROCEDURE — 86803 HEPATITIS C AB TEST: CPT

## 2023-01-02 PROCEDURE — 82306 VITAMIN D 25 HYDROXY: CPT

## 2023-01-02 PROCEDURE — 85025 COMPLETE CBC W/AUTO DIFF WBC: CPT

## 2023-01-02 PROCEDURE — 87389 HIV-1 AG W/HIV-1&-2 AB AG IA: CPT

## 2023-01-02 PROCEDURE — 36415 COLL VENOUS BLD VENIPUNCTURE: CPT

## 2023-01-02 PROCEDURE — 84443 ASSAY THYROID STIM HORMONE: CPT

## 2023-01-02 PROCEDURE — 80061 LIPID PANEL: CPT

## 2023-01-05 ENCOUNTER — OFFICE VISIT (OUTPATIENT)
Dept: FAMILY MEDICINE | Facility: CLINIC | Age: 53
End: 2023-01-05
Payer: COMMERCIAL

## 2023-01-05 VITALS
SYSTOLIC BLOOD PRESSURE: 122 MMHG | OXYGEN SATURATION: 97 % | TEMPERATURE: 98 F | DIASTOLIC BLOOD PRESSURE: 82 MMHG | WEIGHT: 180 LBS | HEART RATE: 64 BPM | RESPIRATION RATE: 16 BRPM | BODY MASS INDEX: 30.73 KG/M2 | HEIGHT: 64 IN

## 2023-01-05 DIAGNOSIS — E66.9 CLASS 1 OBESITY WITH BODY MASS INDEX (BMI) OF 30.0 TO 30.9 IN ADULT, UNSPECIFIED OBESITY TYPE, UNSPECIFIED WHETHER SERIOUS COMORBIDITY PRESENT: ICD-10-CM

## 2023-01-05 DIAGNOSIS — Z12.31 BREAST CANCER SCREENING BY MAMMOGRAM: ICD-10-CM

## 2023-01-05 DIAGNOSIS — R21 SKIN RASH: ICD-10-CM

## 2023-01-05 DIAGNOSIS — Z00.00 WELLNESS EXAMINATION: Primary | ICD-10-CM

## 2023-01-05 DIAGNOSIS — E55.9 VITAMIN D DEFICIENCY: ICD-10-CM

## 2023-01-05 DIAGNOSIS — G47.33 OSA ON CPAP: ICD-10-CM

## 2023-01-05 DIAGNOSIS — M79.7 FIBROMYALGIA: ICD-10-CM

## 2023-01-05 DIAGNOSIS — M19.90 ARTHRITIS: ICD-10-CM

## 2023-01-05 DIAGNOSIS — F32.A DEPRESSIVE DISORDER: ICD-10-CM

## 2023-01-05 PROCEDURE — 3074F SYST BP LT 130 MM HG: CPT | Mod: CPTII,,, | Performed by: FAMILY MEDICINE

## 2023-01-05 PROCEDURE — 1159F MED LIST DOCD IN RCRD: CPT | Mod: CPTII,,, | Performed by: FAMILY MEDICINE

## 2023-01-05 PROCEDURE — 3079F DIAST BP 80-89 MM HG: CPT | Mod: CPTII,,, | Performed by: FAMILY MEDICINE

## 2023-01-05 PROCEDURE — 3079F PR MOST RECENT DIASTOLIC BLOOD PRESSURE 80-89 MM HG: ICD-10-PCS | Mod: CPTII,,, | Performed by: FAMILY MEDICINE

## 2023-01-05 PROCEDURE — 1159F PR MEDICATION LIST DOCUMENTED IN MEDICAL RECORD: ICD-10-PCS | Mod: CPTII,,, | Performed by: FAMILY MEDICINE

## 2023-01-05 PROCEDURE — 3008F PR BODY MASS INDEX (BMI) DOCUMENTED: ICD-10-PCS | Mod: CPTII,,, | Performed by: FAMILY MEDICINE

## 2023-01-05 PROCEDURE — 1160F RVW MEDS BY RX/DR IN RCRD: CPT | Mod: CPTII,,, | Performed by: FAMILY MEDICINE

## 2023-01-05 PROCEDURE — 99396 PR PREVENTIVE VISIT,EST,40-64: ICD-10-PCS | Mod: ,,, | Performed by: FAMILY MEDICINE

## 2023-01-05 PROCEDURE — 99396 PREV VISIT EST AGE 40-64: CPT | Mod: ,,, | Performed by: FAMILY MEDICINE

## 2023-01-05 PROCEDURE — 3074F PR MOST RECENT SYSTOLIC BLOOD PRESSURE < 130 MM HG: ICD-10-PCS | Mod: CPTII,,, | Performed by: FAMILY MEDICINE

## 2023-01-05 PROCEDURE — 3008F BODY MASS INDEX DOCD: CPT | Mod: CPTII,,, | Performed by: FAMILY MEDICINE

## 2023-01-05 PROCEDURE — 1160F PR REVIEW ALL MEDS BY PRESCRIBER/CLIN PHARMACIST DOCUMENTED: ICD-10-PCS | Mod: CPTII,,, | Performed by: FAMILY MEDICINE

## 2023-01-05 RX ORDER — METHYLPREDNISOLONE 4 MG/1
TABLET ORAL
Qty: 1 EACH | Refills: 0 | Status: SHIPPED | OUTPATIENT
Start: 2023-01-05

## 2023-01-05 RX ORDER — FLUOXETINE 10 MG/1
10 CAPSULE ORAL DAILY
Qty: 90 CAPSULE | Refills: 3 | Status: SHIPPED | OUTPATIENT
Start: 2023-01-05 | End: 2023-02-16 | Stop reason: SDUPTHER

## 2023-01-05 RX ORDER — DICLOFENAC SODIUM 75 MG/1
1 TABLET, DELAYED RELEASE ORAL 2 TIMES DAILY WITH MEALS
COMMUNITY
Start: 2022-11-25 | End: 2023-01-05 | Stop reason: SDUPTHER

## 2023-01-05 RX ORDER — TRIAMCINOLONE ACETONIDE 1 MG/G
CREAM TOPICAL 2 TIMES DAILY
Qty: 80 G | Refills: 2 | Status: SHIPPED | OUTPATIENT
Start: 2023-01-05 | End: 2024-10-19

## 2023-01-05 RX ORDER — DICLOFENAC SODIUM 75 MG/1
75 TABLET, DELAYED RELEASE ORAL 2 TIMES DAILY WITH MEALS
Qty: 60 TABLET | Refills: 11 | Status: SHIPPED | OUTPATIENT
Start: 2023-01-05 | End: 2024-01-05

## 2023-01-05 NOTE — PROGRESS NOTES
"Subjective:        Patient ID: Lida Walker is a 52 y.o. female.    Chief Complaint: Annual Exam (Wellness/Pt has lab results in chart to be reviewed )      Patient presents to the clinic unaccompanied for her wellness visit.  She did labs prior to her appt and it was reviewed with her at time of appt today.     She has a history of depression and fibromyalgia.  She is currently on Prozac 40 mg daily.  Feels like having more panic attacks and depression symptoms.   She is seeing a counselor.  She is also on gabapentin.  Has tried Paxil (not working), zoloft (made angry), effexor (sexual se), wellbutrin (to fight se of effexor) in the past.      Her vitamin-D has been low in the past.  She is currently supplementing. Not taking daily. Level 1/2023 is improved but almost normal.      She reports a rash related to sun exposure.  She uses triamcinolone topical cream and Medrol Dosepaks which is helpful.  She does not currently have the rash. Usually occurs in September when they go to the beach.      Has miky on cpap. Reports compliance     She has had a hysterectomy due to fibroids in 2008 with Dr. Stevens in [place].  Last mammogram 10/2022 was normal. She had colon cancer screening in January 2021 with Dr. gonzalez        +family history of colon cancer in brother (Dx age 57)    Review of Systems   Constitutional:  Positive for fatigue.   HENT: Negative.     Respiratory: Negative.     Cardiovascular: Negative.    Gastrointestinal: Negative.    Genitourinary: Negative.    Psychiatric/Behavioral:  Positive for dysphoric mood. The patient is nervous/anxious.        Review of patient's allergies indicates:   Allergen Reactions    Lorcet (propoxyphene)     Lortab [hydrocodone-acetaminophen]       Vitals:    01/05/23 0804   BP: 122/82   BP Location: Left arm   Pulse: 64   Resp: 16   Temp: 98 °F (36.7 °C)   TempSrc: Temporal   SpO2: 97%   Weight: 81.6 kg (180 lb)   Height: 5' 4" (1.626 m)      Social History "     Socioeconomic History    Marital status:    Tobacco Use    Smoking status: Never    Smokeless tobacco: Never   Substance and Sexual Activity    Alcohol use: Yes    Drug use: No    Sexual activity: Not Currently      History reviewed. No pertinent family history.       Objective:     Physical Exam  Vitals and nursing note reviewed.   Constitutional:       Appearance: Normal appearance. She is obese.   HENT:      Head: Normocephalic and atraumatic.      Nose: Nose normal.      Mouth/Throat:      Mouth: Mucous membranes are moist.      Pharynx: Oropharynx is clear.   Eyes:      Extraocular Movements: Extraocular movements intact.   Cardiovascular:      Rate and Rhythm: Normal rate and regular rhythm.      Pulses: Normal pulses.      Heart sounds: Normal heart sounds.   Pulmonary:      Effort: Pulmonary effort is normal.      Breath sounds: Normal breath sounds.   Musculoskeletal:         General: Normal range of motion.      Cervical back: Normal range of motion.   Skin:     General: Skin is warm and dry.   Neurological:      General: No focal deficit present.      Mental Status: She is alert and oriented to person, place, and time. Mental status is at baseline.   Psychiatric:         Mood and Affect: Mood normal.     Current Outpatient Medications on File Prior to Visit   Medication Sig Dispense Refill    cholecalciferol, vitamin D3, 1,250 mcg (50,000 unit) capsule Take 50,000 Units by mouth every 7 days.      FLUoxetine 40 MG capsule Take 1 capsule (40 mg total) by mouth once daily. 90 capsule 3    fluticasone propionate (FLONASE) 50 mcg/actuation nasal spray USE 1 SPRAY INTO EACH NOSTRIL TWICE A DAY 48 mL 1    gabapentin (NEURONTIN) 300 MG capsule Take 1 capsule (300 mg total) by mouth every evening. 30 capsule 8    [DISCONTINUED] methylPREDNISolone (MEDROL DOSEPACK) 4 mg tablet use as directed 1 each 0    [DISCONTINUED] diclofenac (VOLTAREN) 75 MG EC tablet Take 1 tablet by mouth 2 (two) times daily with  meals.      [DISCONTINUED] polyethylene glycol (GLYCOLAX) 17 gram PwPk Take 17 g by mouth once daily. (Patient not taking: Reported on 1/5/2023) 10 each 0    [DISCONTINUED] triamcinolone acetonide 0.1% (KENALOG) 0.1 % cream Apply topically 2 (two) times daily. for 10 days 80 g 2     No current facility-administered medications on file prior to visit.     Health Maintenance   Topic Date Due    TETANUS VACCINE  01/05/2024 (Originally 8/15/1988)    Mammogram  10/03/2023    Lipid Panel  01/02/2024    Hepatitis C Screening  Completed      Results for orders placed or performed in visit on 01/02/23   Comprehensive Metabolic Panel   Result Value Ref Range    Sodium Level 141 136 - 145 mmol/L    Potassium Level 3.8 3.5 - 5.1 mmol/L    Chloride 107 98 - 107 mmol/L    Carbon Dioxide 28 22 - 29 mmol/L    Glucose Level 105 (H) 74 - 100 mg/dL    Blood Urea Nitrogen 12.9 9.8 - 20.1 mg/dL    Creatinine 0.74 0.55 - 1.02 mg/dL    Calcium Level Total 9.4 8.4 - 10.2 mg/dL    Protein Total 7.2 6.4 - 8.3 gm/dL    Albumin Level 4.0 3.5 - 5.0 g/dL    Globulin 3.2 2.4 - 3.5 gm/dL    Albumin/Globulin Ratio 1.3 1.1 - 2.0 ratio    Bilirubin Total 0.5 <=1.5 mg/dL    Alkaline Phosphatase 87 40 - 150 unit/L    Alanine Aminotransferase 24 0 - 55 unit/L    Aspartate Aminotransferase 19 5 - 34 unit/L    eGFR >60 mls/min/1.73/m2   HIV 1/2 Ag/Ab (4th Gen)   Result Value Ref Range    HIV Nonreactive Nonreactive   Hepatitis C Antibody   Result Value Ref Range    Hepatitis C Antibody Nonreactive Nonreactive   Lipid Panel   Result Value Ref Range    Cholesterol Total 203 (H) <=200 mg/dL    HDL Cholesterol 36 35 - 60 mg/dL    Triglyceride 138 37 - 140 mg/dL    Cholesterol/HDL Ratio 6 (H) 0 - 5    Very Low Density Lipoprotein 28     LDL Cholesterol 139.00 50.00 - 140.00 mg/dL   TSH   Result Value Ref Range    Thyroid Stimulating Hormone 1.060 0.350 - 4.940 uIU/mL   Vitamin D   Result Value Ref Range    Vit D 25 OH 29.8 (L) 30.0 - 80.0 ng/mL   CBC with  Differential   Result Value Ref Range    WBC 4.6 4.5 - 11.5 x10(3)/mcL    RBC 4.38 4.20 - 5.40 x10(6)/mcL    Hgb 13.4 12.0 - 16.0 gm/dL    Hct 40.9 37.0 - 47.0 %    MCV 93.4 80.0 - 94.0 fL    MCH 30.6 pg    MCHC 32.8 (L) 33.0 - 36.0 mg/dL    RDW 12.8 11.0 - 14.5 %    Platelet 179 140 - 371 x10(3)/mcL    MPV 10.8 9.4 - 12.4 fL    Neut % 56.7 %    Lymph % 33.0 %    Mono % 6.9 %    Eos % 2.6 %    Basophil % 0.6 %    Lymph # 1.53 0.6 - 4.6 x10(3)/mcL    Neut # 2.62 2.1 - 9.2 x10(3)/mcL    Mono # 0.32 0.1 - 1.3 x10(3)/mcL    Eos # 0.12 0 - 0.9 x10(3)/mcL    Baso # 0.03 0 - 0.2 x10(3)/mcL    IG# 0.01 0 - 0.04 x10(3)/mcL    IG% 0.2 %    NRBC% 0.0 0 - 1 %          Assessment & Plan:     Active Problem List with Overview Notes    Diagnosis Date Noted    Wellness examination 01/05/2023    Arthritis 01/05/2023    Skin rash 01/05/2023    SAMMY on CPAP 01/05/2023    Depressive disorder 06/28/2022    Breast cancer screening by mammogram 06/28/2022    Obesity 06/28/2022    Vitamin D deficiency 06/28/2022    Family history of colon cancer 06/28/2022    Fibromyalgia 04/09/2013       1. Wellness examination  Assessment & Plan:  Previously done labs reviewed with patient at time of appointment today  Mammogram 10/2022  No longer does pap due to history of hyst  Colonoscopy with Dr. gonzalez 1/2021  Declines immunizations today      Orders:  -     CBC Auto Differential; Future; Expected date: 01/05/2024  -     Comprehensive Metabolic Panel; Future; Expected date: 01/05/2024  -     Lipid Panel; Future; Expected date: 01/05/2024  -     TSH; Future; Expected date: 01/05/2024  -     Hemoglobin A1C; Future; Expected date: 01/05/2024  -     Urinalysis; Future; Expected date: 01/05/2024  -     Vitamin D; Future; Expected date: 01/05/2024    2. Fibromyalgia  Assessment & Plan:  Stable on current prescription meds.     Orders:  -     CBC Auto Differential; Future; Expected date: 01/05/2024  -     Comprehensive Metabolic Panel; Future; Expected  date: 01/05/2024  -     Lipid Panel; Future; Expected date: 01/05/2024  -     TSH; Future; Expected date: 01/05/2024  -     Hemoglobin A1C; Future; Expected date: 01/05/2024  -     Urinalysis; Future; Expected date: 01/05/2024  -     Vitamin D; Future; Expected date: 01/05/2024    3. Vitamin D deficiency  Assessment & Plan:  Continue to supplement otc.     Orders:  -     CBC Auto Differential; Future; Expected date: 01/05/2024  -     Comprehensive Metabolic Panel; Future; Expected date: 01/05/2024  -     Lipid Panel; Future; Expected date: 01/05/2024  -     TSH; Future; Expected date: 01/05/2024  -     Hemoglobin A1C; Future; Expected date: 01/05/2024  -     Urinalysis; Future; Expected date: 01/05/2024  -     Vitamin D; Future; Expected date: 01/05/2024    4. Breast cancer screening by mammogram  Assessment & Plan:  [facility] 10/2022. Repeat ordered    Orders:  -     Mammo Digital Screening Bilat; Future; Expected date: 10/05/2023  -     CBC Auto Differential; Future; Expected date: 01/05/2024  -     Comprehensive Metabolic Panel; Future; Expected date: 01/05/2024  -     Lipid Panel; Future; Expected date: 01/05/2024  -     TSH; Future; Expected date: 01/05/2024  -     Hemoglobin A1C; Future; Expected date: 01/05/2024  -     Urinalysis; Future; Expected date: 01/05/2024  -     Vitamin D; Future; Expected date: 01/05/2024    5. Depressive disorder  Assessment & Plan:  Will increase prescription of prozac to 50mg daily.  New rx sent in. Keep appointments counselor.  Contact clinic for  Concerns.     Orders:  -     CBC Auto Differential; Future; Expected date: 01/05/2024  -     Comprehensive Metabolic Panel; Future; Expected date: 01/05/2024  -     Lipid Panel; Future; Expected date: 01/05/2024  -     TSH; Future; Expected date: 01/05/2024  -     Hemoglobin A1C; Future; Expected date: 01/05/2024  -     Urinalysis; Future; Expected date: 01/05/2024  -     Vitamin D; Future; Expected date: 01/05/2024    6. Class 1 obesity  with body mass index (BMI) of 30.0 to 30.9 in adult, unspecified obesity type, unspecified whether serious comorbidity present  Assessment & Plan:  Encourage lifestyle change    Orders:  -     CBC Auto Differential; Future; Expected date: 01/05/2024  -     Comprehensive Metabolic Panel; Future; Expected date: 01/05/2024  -     Lipid Panel; Future; Expected date: 01/05/2024  -     TSH; Future; Expected date: 01/05/2024  -     Hemoglobin A1C; Future; Expected date: 01/05/2024  -     Urinalysis; Future; Expected date: 01/05/2024  -     Vitamin D; Future; Expected date: 01/05/2024    7. Arthritis  Assessment & Plan:  Stable on prescription nsaid. Take with food. Refill sent in.       8. Skin rash  Assessment & Plan:  Stable on current meds prn. Refill of po steroid and cream sent in.       9. SAMMY on CPAP  Assessment & Plan:  Reports compliance and benefits from continued use        Other orders  -     FLUoxetine 10 MG capsule; Take 1 capsule (10 mg total) by mouth once daily. To take with 40mg daily for a total of 50mg prozac daily  Dispense: 90 capsule; Refill: 3  -     diclofenac (VOLTAREN) 75 MG EC tablet; Take 1 tablet (75 mg total) by mouth 2 (two) times daily with meals.  Dispense: 60 tablet; Refill: 11  -     methylPREDNISolone (MEDROL DOSEPACK) 4 mg tablet; use as directed  Dispense: 1 each; Refill: 0  -     triamcinolone acetonide 0.1% (KENALOG) 0.1 % cream; Apply topically 2 (two) times daily. for 10 days  Dispense: 80 g; Refill: 2         Follow up in about 6 weeks (around 2/16/2023) for depression.

## 2023-01-05 NOTE — ASSESSMENT & PLAN NOTE
Previously done labs reviewed with patient at time of appointment today  Mammogram 10/2022  No longer does pap due to history of hyst  Colonoscopy with Dr. gonzalez 1/2021  Declines immunizations today

## 2023-01-05 NOTE — ASSESSMENT & PLAN NOTE
Will increase prescription of prozac to 50mg daily.  New rx sent in. Keep appointments counselor.  Contact clinic for  Concerns.

## 2023-02-16 ENCOUNTER — OFFICE VISIT (OUTPATIENT)
Dept: FAMILY MEDICINE | Facility: CLINIC | Age: 53
End: 2023-02-16
Payer: COMMERCIAL

## 2023-02-16 VITALS
WEIGHT: 178.81 LBS | DIASTOLIC BLOOD PRESSURE: 70 MMHG | SYSTOLIC BLOOD PRESSURE: 110 MMHG | HEART RATE: 70 BPM | HEIGHT: 64 IN | TEMPERATURE: 98 F | BODY MASS INDEX: 30.53 KG/M2 | RESPIRATION RATE: 20 BRPM | OXYGEN SATURATION: 95 %

## 2023-02-16 DIAGNOSIS — E66.9 CLASS 1 OBESITY WITH BODY MASS INDEX (BMI) OF 30.0 TO 30.9 IN ADULT, UNSPECIFIED OBESITY TYPE, UNSPECIFIED WHETHER SERIOUS COMORBIDITY PRESENT: ICD-10-CM

## 2023-02-16 DIAGNOSIS — F32.A DEPRESSIVE DISORDER: Primary | ICD-10-CM

## 2023-02-16 PROCEDURE — 3008F PR BODY MASS INDEX (BMI) DOCUMENTED: ICD-10-PCS | Mod: CPTII,,, | Performed by: FAMILY MEDICINE

## 2023-02-16 PROCEDURE — 1160F PR REVIEW ALL MEDS BY PRESCRIBER/CLIN PHARMACIST DOCUMENTED: ICD-10-PCS | Mod: CPTII,,, | Performed by: FAMILY MEDICINE

## 2023-02-16 PROCEDURE — 1160F RVW MEDS BY RX/DR IN RCRD: CPT | Mod: CPTII,,, | Performed by: FAMILY MEDICINE

## 2023-02-16 PROCEDURE — 99213 PR OFFICE/OUTPT VISIT, EST, LEVL III, 20-29 MIN: ICD-10-PCS | Mod: ,,, | Performed by: FAMILY MEDICINE

## 2023-02-16 PROCEDURE — 3074F SYST BP LT 130 MM HG: CPT | Mod: CPTII,,, | Performed by: FAMILY MEDICINE

## 2023-02-16 PROCEDURE — 3078F DIAST BP <80 MM HG: CPT | Mod: CPTII,,, | Performed by: FAMILY MEDICINE

## 2023-02-16 PROCEDURE — 3008F BODY MASS INDEX DOCD: CPT | Mod: CPTII,,, | Performed by: FAMILY MEDICINE

## 2023-02-16 PROCEDURE — 99213 OFFICE O/P EST LOW 20 MIN: CPT | Mod: ,,, | Performed by: FAMILY MEDICINE

## 2023-02-16 PROCEDURE — 3074F PR MOST RECENT SYSTOLIC BLOOD PRESSURE < 130 MM HG: ICD-10-PCS | Mod: CPTII,,, | Performed by: FAMILY MEDICINE

## 2023-02-16 PROCEDURE — 1159F MED LIST DOCD IN RCRD: CPT | Mod: CPTII,,, | Performed by: FAMILY MEDICINE

## 2023-02-16 PROCEDURE — 3078F PR MOST RECENT DIASTOLIC BLOOD PRESSURE < 80 MM HG: ICD-10-PCS | Mod: CPTII,,, | Performed by: FAMILY MEDICINE

## 2023-02-16 PROCEDURE — 1159F PR MEDICATION LIST DOCUMENTED IN MEDICAL RECORD: ICD-10-PCS | Mod: CPTII,,, | Performed by: FAMILY MEDICINE

## 2023-02-16 RX ORDER — FLUOXETINE HYDROCHLORIDE 40 MG/1
40 CAPSULE ORAL DAILY
Qty: 90 CAPSULE | Refills: 3 | Status: SHIPPED | OUTPATIENT
Start: 2023-02-16 | End: 2024-04-01

## 2023-02-16 RX ORDER — FLUOXETINE 10 MG/1
10 CAPSULE ORAL DAILY
Qty: 90 CAPSULE | Refills: 3 | Status: SHIPPED | OUTPATIENT
Start: 2023-02-16 | End: 2024-04-01

## 2023-02-16 NOTE — ASSESSMENT & PLAN NOTE
on prozac to 50mg (40+10) daily. Doing well on current dosing. Refills sent in.  Keep appointments counselor.  Contact clinic for concerns. Patient is agreeable to plan and verbalized understanding

## 2023-02-16 NOTE — PROGRESS NOTES
Subjective:        Patient ID: Lida Walker is a 52 y.o. female.    Chief Complaint: Medication f/u (Prozac)      Patient presents to the clinic unaccompanied for follow up.  She is due for her wellness visit in january     She has a history of depression and fibromyalgia.  She is currently on Prozac 40 mg daily. At her appt 1/2023, she reported having more panic attacks and depression symptoms.   She is seeing a counselor.  She is also on gabapentin.  Has tried Paxil (not working), zoloft (made angry), effexor (sexual se), wellbutrin (to fight se of effexor) in the past.  We increased her prozac to 50mg daily.  She is here today for follow up.  Feels as though has helped some. Less panic-y.  Taking qhs.            Her vitamin-D has been low in the past.  She is currently supplementing. Not taking daily. Level 1/2023 is improved but almost normal.      She reports a rash related to sun exposure.  She uses triamcinolone topical cream and Medrol Dosepaks as needed which is helpful.  She does not currently have the rash. Usually occurs in September when they go to the beach.       Has miky on cpap. Reports compliance    She is obese.  Has lost 2# since lov.      She has had a hysterectomy due to fibroids in 2008 with Dr. Stevens in [place].  Last mammogram 10/2022 was normal. She had colon cancer screening in January 2021 with Dr. gonzalez        +family history of colon cancer in brother (Dx age 57)    Review of Systems   Constitutional: Negative.    HENT: Negative.     Respiratory: Negative.     Cardiovascular: Negative.    Gastrointestinal: Negative.    Genitourinary: Negative.        Review of patient's allergies indicates:   Allergen Reactions    Lorcet (propoxyphene)     Lortab [hydrocodone-acetaminophen]       Vitals:    02/16/23 0907   BP: 110/70   BP Location: Left arm   Patient Position: Sitting   BP Method: Medium (Automatic)   Pulse: 70   Resp: 20   Temp: 98.2 °F (36.8 °C)   TempSrc: Oral   SpO2:  "95%   Weight: 81.1 kg (178 lb 12.8 oz)   Height: 5' 4" (1.626 m)      Social History     Socioeconomic History    Marital status:    Tobacco Use    Smoking status: Never    Smokeless tobacco: Never   Substance and Sexual Activity    Alcohol use: Yes     Comment: Occasionally    Drug use: No    Sexual activity: Not Currently      History reviewed. No pertinent family history.       Objective:     Physical Exam  Vitals and nursing note reviewed.   Constitutional:       Appearance: Normal appearance. She is obese.   HENT:      Head: Normocephalic and atraumatic.      Nose: Nose normal.      Mouth/Throat:      Mouth: Mucous membranes are moist.      Pharynx: Oropharynx is clear.   Eyes:      Extraocular Movements: Extraocular movements intact.   Cardiovascular:      Rate and Rhythm: Normal rate and regular rhythm.      Pulses: Normal pulses.      Heart sounds: Normal heart sounds.   Pulmonary:      Effort: Pulmonary effort is normal.      Breath sounds: Normal breath sounds.   Musculoskeletal:         General: Normal range of motion.      Cervical back: Normal range of motion.   Skin:     General: Skin is warm and dry.   Neurological:      General: No focal deficit present.      Mental Status: She is alert and oriented to person, place, and time. Mental status is at baseline.   Psychiatric:         Mood and Affect: Mood normal.     Current Outpatient Medications on File Prior to Visit   Medication Sig Dispense Refill    cholecalciferol, vitamin D3, 1,250 mcg (50,000 unit) capsule Take 50,000 Units by mouth every 7 days.      diclofenac (VOLTAREN) 75 MG EC tablet Take 1 tablet (75 mg total) by mouth 2 (two) times daily with meals. 60 tablet 11    fluticasone propionate (FLONASE) 50 mcg/actuation nasal spray USE 1 SPRAY INTO EACH NOSTRIL TWICE A DAY 48 mL 1    gabapentin (NEURONTIN) 300 MG capsule Take 1 capsule (300 mg total) by mouth every evening. 30 capsule 8    methylPREDNISolone (MEDROL DOSEPACK) 4 mg tablet " use as directed 1 each 0    [DISCONTINUED] FLUoxetine 10 MG capsule Take 1 capsule (10 mg total) by mouth once daily. To take with 40mg daily for a total of 50mg prozac daily 90 capsule 3    [DISCONTINUED] FLUoxetine 40 MG capsule Take 1 capsule (40 mg total) by mouth once daily. 90 capsule 3    triamcinolone acetonide 0.1% (KENALOG) 0.1 % cream Apply topically 2 (two) times daily. for 10 days 80 g 2     No current facility-administered medications on file prior to visit.     Health Maintenance   Topic Date Due    TETANUS VACCINE  01/05/2024 (Originally 8/15/1988)    Mammogram  10/03/2023    Lipid Panel  01/02/2024    Hepatitis C Screening  Completed      Results for orders placed or performed in visit on 01/02/23   Comprehensive Metabolic Panel   Result Value Ref Range    Sodium Level 141 136 - 145 mmol/L    Potassium Level 3.8 3.5 - 5.1 mmol/L    Chloride 107 98 - 107 mmol/L    Carbon Dioxide 28 22 - 29 mmol/L    Glucose Level 105 (H) 74 - 100 mg/dL    Blood Urea Nitrogen 12.9 9.8 - 20.1 mg/dL    Creatinine 0.74 0.55 - 1.02 mg/dL    Calcium Level Total 9.4 8.4 - 10.2 mg/dL    Protein Total 7.2 6.4 - 8.3 gm/dL    Albumin Level 4.0 3.5 - 5.0 g/dL    Globulin 3.2 2.4 - 3.5 gm/dL    Albumin/Globulin Ratio 1.3 1.1 - 2.0 ratio    Bilirubin Total 0.5 <=1.5 mg/dL    Alkaline Phosphatase 87 40 - 150 unit/L    Alanine Aminotransferase 24 0 - 55 unit/L    Aspartate Aminotransferase 19 5 - 34 unit/L    eGFR >60 mls/min/1.73/m2   HIV 1/2 Ag/Ab (4th Gen)   Result Value Ref Range    HIV Nonreactive Nonreactive   Hepatitis C Antibody   Result Value Ref Range    Hepatitis C Antibody Nonreactive Nonreactive   Lipid Panel   Result Value Ref Range    Cholesterol Total 203 (H) <=200 mg/dL    HDL Cholesterol 36 35 - 60 mg/dL    Triglyceride 138 37 - 140 mg/dL    Cholesterol/HDL Ratio 6 (H) 0 - 5    Very Low Density Lipoprotein 28     LDL Cholesterol 139.00 50.00 - 140.00 mg/dL   TSH   Result Value Ref Range    Thyroid Stimulating  Hormone 1.060 0.350 - 4.940 uIU/mL   Vitamin D   Result Value Ref Range    Vit D 25 OH 29.8 (L) 30.0 - 80.0 ng/mL   CBC with Differential   Result Value Ref Range    WBC 4.6 4.5 - 11.5 x10(3)/mcL    RBC 4.38 4.20 - 5.40 x10(6)/mcL    Hgb 13.4 12.0 - 16.0 gm/dL    Hct 40.9 37.0 - 47.0 %    MCV 93.4 80.0 - 94.0 fL    MCH 30.6 pg    MCHC 32.8 (L) 33.0 - 36.0 mg/dL    RDW 12.8 11.0 - 14.5 %    Platelet 179 140 - 371 x10(3)/mcL    MPV 10.8 9.4 - 12.4 fL    Neut % 56.7 %    Lymph % 33.0 %    Mono % 6.9 %    Eos % 2.6 %    Basophil % 0.6 %    Lymph # 1.53 0.6 - 4.6 x10(3)/mcL    Neut # 2.62 2.1 - 9.2 x10(3)/mcL    Mono # 0.32 0.1 - 1.3 x10(3)/mcL    Eos # 0.12 0 - 0.9 x10(3)/mcL    Baso # 0.03 0 - 0.2 x10(3)/mcL    IG# 0.01 0 - 0.04 x10(3)/mcL    IG% 0.2 %    NRBC% 0.0 0 - 1 %          Assessment & Plan:     Active Problem List with Overview Notes    Diagnosis Date Noted    Wellness examination 01/05/2023    Arthritis 01/05/2023    Skin rash 01/05/2023    SAMMY on CPAP 01/05/2023    Depressive disorder 06/28/2022    Breast cancer screening by mammogram 06/28/2022    Obesity 06/28/2022    Vitamin D deficiency 06/28/2022    Family history of colon cancer 06/28/2022    Fibromyalgia 04/09/2013       1. Depressive disorder  Assessment & Plan:  on prozac to 50mg (40+10) daily. Doing well on current dosing. Refills sent in.  Keep appointments counselor.  Contact clinic for concerns. Patient is agreeable to plan and verbalized understanding      2. Class 1 obesity with body mass index (BMI) of 30.0 to 30.9 in adult, unspecified obesity type, unspecified whether serious comorbidity present  Assessment & Plan:  Lost 2# since lov. Continue with lifestyle change      Other orders  -     FLUoxetine 10 MG capsule; Take 1 capsule (10 mg total) by mouth once daily. To take with 40mg daily for a total of 50mg prozac daily  Dispense: 90 capsule; Refill: 3  -     FLUoxetine 40 MG capsule; Take 1 capsule (40 mg total) by mouth once daily.   Dispense: 90 capsule; Refill: 3         Follow up in about 11 months (around 1/16/2024) for Wellness with Labs. Orders in

## 2023-04-10 PROBLEM — Z00.00 WELLNESS EXAMINATION: Status: RESOLVED | Noted: 2023-01-05 | Resolved: 2023-04-10

## 2023-08-28 ENCOUNTER — TELEPHONE (OUTPATIENT)
Dept: FAMILY MEDICINE | Facility: CLINIC | Age: 53
End: 2023-08-28
Payer: COMMERCIAL

## 2023-08-28 NOTE — TELEPHONE ENCOUNTER
Does she know if her mom had genetic testing? What was her age at dx?    Last mmg 10/2022.  She can continue doing mmg annually.   Is she scheduled for her mmg in 10/2023? Order is in chart....    Usually radiologist will recommend breast mri which is done annually as well (usually 6 months after mmg so every 6 months she is screened).

## 2023-08-28 NOTE — TELEPHONE ENCOUNTER
----- Message from Ghazal Koroma sent at 8/28/2023 12:06 PM CDT -----  Regarding: med advice  .Type:  Needs Medical Advice    Who Called: Pt  Symptoms (please be specific):    How long has patient had these symptoms:    Pharmacy name and phone #:    Would the patient rather a call back or a response via [facility]? Call back  Best Call Back Number: [phone]  Additional Information: Pt recently found out that her mother has breast cancer, wants to know if she need another mammo done sooner or should she just cont checking routinely. Please f/u.

## 2023-08-29 NOTE — TELEPHONE ENCOUNTER
Patient said her mom is 78 at dx. She also said yes her mom has had genetic testing and does carry breast cancer gene.     She is calling the breast center to schedule for October, she had held off on scheduling due to mom's diagnosis to hear back from us first.    Patient also said that she would be ok doing MRI 6 months after mmg if that is recommended. Please advise on any other orders if needed. Thank you

## 2023-10-04 ENCOUNTER — HOSPITAL ENCOUNTER (OUTPATIENT)
Dept: RADIOLOGY | Facility: HOSPITAL | Age: 53
Discharge: HOME | End: 2023-10-04
Attending: FAMILY MEDICINE
Payer: COMMERCIAL

## 2023-10-04 DIAGNOSIS — Z12.31 BREAST CANCER SCREENING BY MAMMOGRAM: ICD-10-CM

## 2023-10-04 PROCEDURE — 77067 SCR MAMMO BI INCL CAD: CPT | Mod: 26,,, | Performed by: RADIOLOGY

## 2023-10-04 PROCEDURE — 77063 MAMMO DIGITAL SCREENING BILAT WITH TOMO: ICD-10-PCS | Mod: 26,,, | Performed by: RADIOLOGY

## 2023-10-04 PROCEDURE — 77063 BREAST TOMOSYNTHESIS BI: CPT | Mod: 26,,, | Performed by: RADIOLOGY

## 2023-10-04 PROCEDURE — 77067 SCR MAMMO BI INCL CAD: CPT | Mod: TC

## 2023-10-04 PROCEDURE — 77067 MAMMO DIGITAL SCREENING BILAT WITH TOMO: ICD-10-PCS | Mod: 26,,, | Performed by: RADIOLOGY

## 2023-10-05 NOTE — PROGRESS NOTES
Please inform patient of normal MMG. Repeat in 1 year.     Due to her family history of breast cancer, would she like to see a high risk breast clinic and have annual breast MRI?

## 2023-10-06 DIAGNOSIS — Z91.89 AT HIGH RISK FOR BREAST CANCER: Primary | ICD-10-CM

## 2023-10-09 ENCOUNTER — TELEPHONE (OUTPATIENT)
Dept: FAMILY MEDICINE | Facility: CLINIC | Age: 53
End: 2023-10-09
Payer: COMMERCIAL

## 2023-10-09 DIAGNOSIS — Z80.3 FAMILY HISTORY OF BREAST CANCER: Primary | ICD-10-CM

## 2023-10-09 NOTE — TELEPHONE ENCOUNTER
Patient asked if you can order blood work to test for breast cancer gene since her mom has it, and/or refer her to somewhere to have that done? Please advise

## 2023-10-10 NOTE — TELEPHONE ENCOUNTER
We would need to refer her to high risk breast clinic for counseling and genetic testing. Referral order placed.     I have signed for the following orders AND/OR meds.  Please call the patient and ask the patient to schedule the testing AND/OR inform about any medications that were sent.      Orders Placed This Encounter   Procedures    Ambulatory referral/consult to Breast Surgery     Standing Status:   Future     Standing Expiration Date:   11/10/2024     Referral Priority:   Routine     Referral Type:   Consultation     Referral Reason:   Specialty Services Required     Requested Specialty:   Breast Surgery     Number of Visits Requested:   1

## 2023-10-18 ENCOUNTER — OFFICE VISIT (OUTPATIENT)
Dept: SURGERY | Facility: CLINIC | Age: 53
End: 2023-10-18
Payer: COMMERCIAL

## 2023-10-18 VITALS
SYSTOLIC BLOOD PRESSURE: 134 MMHG | TEMPERATURE: 98 F | WEIGHT: 178.19 LBS | HEART RATE: 66 BPM | BODY MASS INDEX: 30.42 KG/M2 | DIASTOLIC BLOOD PRESSURE: 86 MMHG | RESPIRATION RATE: 18 BRPM | OXYGEN SATURATION: 97 % | HEIGHT: 64 IN

## 2023-10-18 DIAGNOSIS — Z91.89 AT HIGH RISK FOR BREAST CANCER: ICD-10-CM

## 2023-10-18 DIAGNOSIS — Z12.31 SCREENING MAMMOGRAM FOR BREAST CANCER: ICD-10-CM

## 2023-10-18 DIAGNOSIS — Z80.3 FAMILY HISTORY OF BREAST CANCER: Primary | ICD-10-CM

## 2023-10-18 PROCEDURE — 1159F PR MEDICATION LIST DOCUMENTED IN MEDICAL RECORD: ICD-10-PCS | Mod: CPTII,S$GLB,,

## 2023-10-18 PROCEDURE — 1159F MED LIST DOCD IN RCRD: CPT | Mod: CPTII,S$GLB,,

## 2023-10-18 PROCEDURE — 3079F DIAST BP 80-89 MM HG: CPT | Mod: CPTII,S$GLB,,

## 2023-10-18 PROCEDURE — 3008F PR BODY MASS INDEX (BMI) DOCUMENTED: ICD-10-PCS | Mod: CPTII,S$GLB,,

## 2023-10-18 PROCEDURE — 99205 OFFICE O/P NEW HI 60 MIN: CPT | Mod: S$GLB,,,

## 2023-10-18 PROCEDURE — 3008F BODY MASS INDEX DOCD: CPT | Mod: CPTII,S$GLB,,

## 2023-10-18 PROCEDURE — 99205 PR OFFICE/OUTPT VISIT, NEW, LEVL V, 60-74 MIN: ICD-10-PCS | Mod: S$GLB,,,

## 2023-10-18 PROCEDURE — 99999 PR PBB SHADOW E&M-EST. PATIENT-LVL V: ICD-10-PCS | Mod: PBBFAC,,,

## 2023-10-18 PROCEDURE — 3075F PR MOST RECENT SYSTOLIC BLOOD PRESS GE 130-139MM HG: ICD-10-PCS | Mod: CPTII,S$GLB,,

## 2023-10-18 PROCEDURE — 3075F SYST BP GE 130 - 139MM HG: CPT | Mod: CPTII,S$GLB,,

## 2023-10-18 PROCEDURE — 99999 PR PBB SHADOW E&M-EST. PATIENT-LVL V: CPT | Mod: PBBFAC,,,

## 2023-10-18 PROCEDURE — 3079F PR MOST RECENT DIASTOLIC BLOOD PRESSURE 80-89 MM HG: ICD-10-PCS | Mod: CPTII,S$GLB,,

## 2023-10-18 RX ORDER — ACETAMINOPHEN 500 MG
500 TABLET ORAL
COMMUNITY

## 2023-10-18 NOTE — PROGRESS NOTES
[facility] Breast Surg  Breast Surgical Oncology  New Patient Office Visit - H&P      Referring Provider: Dr. Skylar Tejeda  PCP: Skylar Tejeda MD   Care Team:  OBGYN: No data on file.    Chief Complaint:   Chief Complaint   Patient presents with    Breast Cancer Screening     New High Risk patient referred to clinic by Dr. Skylar Tejeda due to family hx of BC. No complaints of breast pain, discharge, swelling.        Subjective:     HPI:  Lida Walker is a 53 y.o. female who presents on 10/18/2023 for evaluation of risk for breast cancer. Based on the Tyrer-Cuzick Breast Cancer Risk Model, her lifetime risk is calculated to be 47.2%. Her 5 year Leola risk score is 2.6%. She is doing well today. She does her self breast exams. She currently denies any new breast issues including rashes, redness, swelling, nipple discharge, or new lumps/masses. She does have a history of intermittent breast pain in bilateral UOQ. 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 She does not exercise regularly. She does not use tobacco products, and she drinks alcohol on occasion.     Of note, her mother was recently diagnosed with breast cancer at age of 78 and underwent genetic testing. Patient's mother  is BRCA2 positive, but patient nor siblings have been tested yet.     Imaging:   10/5/2023 BL SC MG: There is a biopsy clip in the right breast. No significant masses, calcifications, or other findings are seen in either breast. There has been no significant interval change BI-RADS: 2 Benign    Pathology:   10/22/2019 Ultrasound guided biopsy of the right breast 7:00 right breast 3 cm from the nipple hypoechoic duct revealed benign usual ductal hyperplasia, fibrocystic changes, and columnar cell change.      OB/GYN History:  Age at Menarche Onset: 12  Menopausal Status: postmenopausal, LMP: No LMP recorded. Patient has had a hysterectomy.  Hysterectomy/Oophorectomy: hysterectomy without  oopherectomy, at age 37  Hormonal birth control (duration): 20 years  Pregnancy History:   Age at first live birth: 21  Hormone Replacement Therapy: No, none    Other:  MG breast density: BIRADS B  Prior thoracic RT: none  Genetic testing: none  Ashkenazi [employer] descent: No    Family History:  Family History   Problem Relation Age of Onset    Breast cancer Mother 78    Skin cancer Mother 71    Hypertension Mother     Atrial fibrillation Father     Colon cancer Brother 57    Hypertension Paternal Grandmother     Cancer Paternal Grandfather         Patient History:  Past Medical History:   Diagnosis Date    Fibromyalgia     Personal history of colonic polyps 2021       Past Surgical History:   Procedure Laterality Date    Bladder lift      CHOLECYSTECTOMY      COLONOSCOPY  2021    HYSTERECTOMY         Social History     Socioeconomic History    Marital status:    Tobacco Use    Smoking status: Never    Smokeless tobacco: Never   Substance and Sexual Activity    Alcohol use: Yes     Comment: Occasionally    Drug use: No    Sexual activity: Not Currently         There is no immunization history on file for this patient.    Medications/Allergies:    Current Outpatient Medications:     acetaminophen (TYLENOL EXTRA STRENGTH) 500 MG tablet, Take 500 mg by mouth as needed for Pain., Disp: , Rfl:     cholecalciferol, vitamin D3, 1,250 mcg (50,000 unit) capsule, Take 50,000 Units by mouth every 7 days., Disp: , Rfl:     FLUoxetine 10 MG capsule, Take 1 capsule (10 mg total) by mouth once daily. To take with 40mg daily for a total of 50mg prozac daily, Disp: 90 capsule, Rfl: 3    FLUoxetine 40 MG capsule, Take 1 capsule (40 mg total) by mouth once daily., Disp: 90 capsule, Rfl: 3    fluticasone propionate (FLONASE) 50 mcg/actuation nasal spray, USE 1 SPRAY INTO EACH NOSTRIL TWICE A DAY, Disp: 48 mL, Rfl: 1    methylPREDNISolone (MEDROL DOSEPACK) 4 mg tablet, use as directed, Disp: 1 each, Rfl: 0     "naproxen sodium (ALEVE ORAL), Take by mouth as needed. As needed for Headaches. Will rotate with Tylenol., Disp: , Rfl:     triamcinolone acetonide 0.1% (KENALOG) 0.1 % cream, Apply topically 2 (two) times daily. for 10 days, Disp: 80 g, Rfl: 2    diclofenac (VOLTAREN) 75 MG EC tablet, Take 1 tablet (75 mg total) by mouth 2 (two) times daily with meals., Disp: 60 tablet, Rfl: 11    gabapentin (NEURONTIN) 300 MG capsule, Take 1 capsule (300 mg total) by mouth every evening., Disp: 30 capsule, Rfl: 8     Review of patient's allergies indicates:   Allergen Reactions    Lorcet (propoxyphene)     Lortab [hydrocodone-acetaminophen]      Review of Systems:  All pertinent history mentioned in HPI.     Objective:     Vitals:  Vitals:    10/18/23 1453   BP: 134/86   Pulse: 66   Resp: 18   Temp: 98.1 °F (36.7 °C)   TempSrc: Oral   SpO2: 97%   Weight: 80.8 kg (178 lb 3.2 oz)   Height: 5' 4" (1.626 m)       Body mass index is 30.59 kg/m².     Physical Exam:  General: The patient is awake, alert and oriented times three. The patient is well nourished and in no acute distress.  Neck: There is no evidence of palpable cervical, supraclavicular or axillary adenopathy. The neck is supple. The thyroid is not enlarged.  Musculoskeletal: The patient has a normal range of motion of her bilateral upper extremities.  Chest: Examination of the chest wall fails to reveal any obvious abnormalities.  The lungs are clear to auscultation bilaterally without rales, rhonchi, or wheezing.  Cardiovascular: The heart has a regular rate and rhythm without murmurs, gallops or rubs.  Breast:   Right:  Examination of right breast fails to reveal any dominant masses or areas of significant focal nodularity. The nipple is everted without evidence of discharge. There is no skin dimpling with movement of the pectoralis. There is no significant skin changes overlying the breast.   Left:  Examination of the left breast fails to reveal any dominant masses or " areas of significant focal nodularity. The nipple is everted without evidence of discharge. There is no skin dimpling with movement of the pectoralis. There are no significant skin changes overlying the breast.  Abdomen: The abdomen is soft, flat, nontender and nondistended with no palpable masses or organomegaly.  Integumentary: no rashes or skin lesions present  Neurologic: cranial nerves intact, no signs of peripheral neurological deficit, motor/sensory function intact    Assessment:     Patient Active Problem List   Diagnosis    Depressive disorder    Fibromyalgia    Breast cancer screening by mammogram    Obesity    Vitamin D deficiency    Family history of colon cancer    Arthritis    Skin rash    SAMMY on CPAP        Lida was seen today for breast cancer screening.    Diagnoses and all orders for this visit:    Family history of breast cancer  -     Mammo Digital Screening Bilat w/ [facility]; Future  -     MRI Breast w/wo Contrast, w/CAD, Bilateral; Future  -     Ambulatory referral/consult to Breast Surgery    Screening mammogram for breast cancer  -     Mammo Digital Screening Bilat w/ Bernardino; Future    At high risk for breast cancer  -     Mammo Digital Screening Bilat w/ Bernardino; Future  -     MRI Breast w/wo Contrast, w/CAD, Bilateral; Future       --------------------------------------------------------------------------------------------------------------  After the initial clinical evaluation nearly 30 minutes were on counseling the patients regarding the options for management. Risk reduction strategies were discussed.     1. Lifestyle factors: As with other types of cancer, studies continue to show that various lifestyle factors may contribute to the development of breast cancer.     Weight: Recent studies have shown that postmenopausal women who are overweight or obese have an increased risk of breast cancer. These women also have a higher risk of having the cancer come back after treatment.     Physical  "activity: Decreased physical activity is associated with an increased risk of developing breast cancer and a higher risk of having the cancer come back after treatment. Regular physical activity may protect against breast cancer by helping women maintain a healthy body weight, lowering hormone levels, or causing changes in a womens metabolism or immune factors.     Alcohol: Current research suggests that having more than 1 to 2 alcoholic drinks, including beer, wine, and spirits, per day raises the risk of breast cancer, as well as the risk of having the cancer come back after treatment.     Food: There is no reliable research that confirms that eating or avoiding specific foods reduces the risk of developing breast cancer or having the cancer come back after treatment. However, eating more fruits and vegetables and fewer animal fats is linked with many health benefits.     2. Prevention:  Surgery to lower cancer risk: For women with BRCA1 or BRCA2 genetic mutations, which substantially increase the risk of breast cancer, preventive removal of the breasts may be considered. The procedure, called a prophylactic mastectomy, appears to reduce the risk of developing breast cancer by at least 95%. Women with these mutations should also consider the preventive removal of the ovaries and fallopian tubes, called a prophylactic salpingo-oophorectomy. This procedure can reduce the risk of developing ovarian cancer, as well as breast cancer, by stopping the ovaries from making estrogen.      Drugs to lower cancer risk (Chemoprevention): Women who have a higher than usual risk of developing breast cancer may consider certain drugs that may help prevent breast cancer. This approach may also be called "chemoprevention." For breast cancer, this is the use of hormone-blocking drugs to reduce cancer risk. The drugs, tamoxifen (Soltamox) and raloxifene (Evista), are approved by the U.S. Food and Drug Administration (FDA) to lower " breast cancer risk. These drugs are called selective estrogen receptor modulators (SERMs) and are not chemotherapy. A SERM is a medication that blocks estrogen receptors in some tissues and not others. Both women who have gone through menopause and those who have not may take tamoxifen. Raloxifene is only approved for women who have gone through menopause. Each drug also has different side effects.     Aromatase inhibitors (AIs) have also been shown to lower breast cancer risk. AIs are a type of hormone-blocking treatment that reduces the amount of estrogen in a woman's body by stopping tissues and organs other than the ovaries from producing estrogen. They can only be used by women who have gone through menopause. However, no AIs have been approved by the FDA for lowering breast cancer risk in women who do not have the disease.     3. Surveillance: Women with a known genetic mutation should follow screening guidelines per the NCCN guidelines. Women with no known genetic mutation  and found to be at greater than 20 percent average lifetime risk of breast cancer are recommended for the following screening recommendations:     Clinical Breast exam: Every 6-12 months starting at age found to be at increased risk by risk model     Mammogram: Per NCCN guidelines, recommended every year starting 10 years younger than the youngest breast cancer case in the family (but not before age 30). May consider beginning breast MRIs at age 30 per ACR guidelines if desired by patient or other clinical considerations. May also consider getting a baseline MG at time of initial high risk consultation, if not already obtained.     Breast MRI: Per NCCN guidelines, recommended every year starting 10 years younger than the youngest breast cancer case in the family (but not before age 25). May consider beginning breast MRIs at age 30 per ACR guidelines if desired by patient or other clinical considerations. If patient has a first-degree  relative with a BRCA1/2 gene mutation, youre encouraged to get genetic counseling and/or testing before getting MRI as part of screening (for those who do not wish to have genetic testing, MRI is recommended). Breast MRI in combination with mammography is better than mammography alone at finding breast cancer in certain women at higher than average risk.     --------------------------------------------------------------------------------------------------------------     Plan:       1. Lifestyle - Healthy lifestyle guidelines were reviewed. She was encouraged to engage in regular exercise, maintain a healthy body weight, and avoid excessive alcohol consumption. Healthy nutritional guidelines were also discussed. Self-breast examination was reviewed with the patient in detail and she was encouraged to perform this on a monthly basis.    2. Surveillance - She desires undergoing high risk screening with annual screening mammograms and breast MRIs. In the absence of significant clinical findings in the interval, I recommend Breast MRI April 2024 and BL SC MG in October 2024. RTC 1 year.    3. Prevention - We had a brief discussion/education about indications for preventative mastectomy or chemoprevention.  She displayed mild interest in chemoprevention, but does not want to start at this time.    4. Genetics - According to NCCN guidelines, she meets criteria for genetic testing. Referral to genetic counseling placed.     5. Breast Pain: I discussed ways to reduce breast pain/tenderness. Avoid caffeine (coffee, teas, chocolate, sodas). Drinking alcohol may also increase the risk of fibrocystic changes and breast pain. I also advised to wear a good, supportive bra both day and night when symptoms are worse. Avoid contact sports and other activities which could cause injury to the breasts. She may also consider taking Vitamin E/Primrose Oil supplementation to reduce pain.  She may consider Voltaren gel as needed for  breast pain.    6. Other routine screening exams:   -  Recommend annual follow up with PCP.    All of her questions were answered. She was advised to call if she develops any questions or concerns.    Eve Duncan PA-C    --------------------------------------------------------------------------------------------------------------  Total time on the date of the visit ranged from 60-74 mins (25828). Total time includes both face-to-face and non-face-to-face time personally spent by myself on the day of the visit.    Non-face-to-face time included:  _X_ preparing to see the patient such as reviewing the patient record  _X_ obtaining and reviewing separately obtained history  _X_ independently interpreting results  _X_ documenting clinical information in electronic health record.    Face-to-face time included:  _X_ performing an appropriate history and examination  _X_ communicating results to the patient  _X_ counseling and educating the patient  __ ordering appropriate medications  _x_ ordering appropriate tests  _X_ ordering appropriate procedures (including follow-up)  _X_ answering any questions the patient had    Total Time spent on date of visit: 60 minutes

## 2023-10-20 ENCOUNTER — OFFICE VISIT (OUTPATIENT)
Dept: FAMILY MEDICINE | Facility: CLINIC | Age: 53
End: 2023-10-20
Payer: COMMERCIAL

## 2023-10-20 ENCOUNTER — LAB VISIT (OUTPATIENT)
Dept: LAB | Facility: HOSPITAL | Age: 53
End: 2023-10-20
Attending: NURSE PRACTITIONER
Payer: COMMERCIAL

## 2023-10-20 VITALS
TEMPERATURE: 99 F | WEIGHT: 175.5 LBS | RESPIRATION RATE: 18 BRPM | OXYGEN SATURATION: 97 % | DIASTOLIC BLOOD PRESSURE: 72 MMHG | HEIGHT: 64 IN | SYSTOLIC BLOOD PRESSURE: 120 MMHG | BODY MASS INDEX: 29.96 KG/M2 | HEART RATE: 65 BPM

## 2023-10-20 DIAGNOSIS — L65.9 HAIR LOSS: ICD-10-CM

## 2023-10-20 DIAGNOSIS — R53.83 FATIGUE, UNSPECIFIED TYPE: Primary | ICD-10-CM

## 2023-10-20 DIAGNOSIS — R53.83 FATIGUE, UNSPECIFIED TYPE: ICD-10-CM

## 2023-10-20 LAB
ALBUMIN SERPL-MCNC: 4.1 G/DL (ref 3.5–5)
ALBUMIN/GLOB SERPL: 1.1 RATIO (ref 1.1–2)
ALP SERPL-CCNC: 94 UNIT/L (ref 40–150)
ALT SERPL-CCNC: 34 UNIT/L (ref 0–55)
APPEARANCE UR: CLEAR
AST SERPL-CCNC: 27 UNIT/L (ref 5–34)
BACTERIA #/AREA URNS AUTO: ABNORMAL /HPF
BASOPHILS # BLD AUTO: 0.03 X10(3)/MCL
BASOPHILS NFR BLD AUTO: 0.6 %
BILIRUB SERPL-MCNC: 0.7 MG/DL
BILIRUB UR QL STRIP.AUTO: NEGATIVE
BUN SERPL-MCNC: 18.2 MG/DL (ref 9.8–20.1)
CALCIUM SERPL-MCNC: 9.6 MG/DL (ref 8.4–10.2)
CHLORIDE SERPL-SCNC: 104 MMOL/L (ref 98–107)
CO2 SERPL-SCNC: 28 MMOL/L (ref 22–29)
COLOR UR AUTO: ABNORMAL
CREAT SERPL-MCNC: 0.71 MG/DL (ref 0.55–1.02)
EOSINOPHIL # BLD AUTO: 0.1 X10(3)/MCL (ref 0–0.9)
EOSINOPHIL NFR BLD AUTO: 2 %
ERYTHROCYTE [DISTWIDTH] IN BLOOD BY AUTOMATED COUNT: 12.3 % (ref 11.5–17)
GFR SERPLBLD CREATININE-BSD FMLA CKD-EPI: >60 MLS/MIN/1.73/M2
GLOBULIN SER-MCNC: 3.7 GM/DL (ref 2.4–3.5)
GLUCOSE SERPL-MCNC: 97 MG/DL (ref 74–100)
GLUCOSE UR QL STRIP.AUTO: NEGATIVE
HCT VFR BLD AUTO: 42.4 % (ref 37–47)
HGB BLD-MCNC: 13.9 G/DL (ref 12–16)
IMM GRANULOCYTES # BLD AUTO: 0.01 X10(3)/MCL (ref 0–0.04)
IMM GRANULOCYTES NFR BLD AUTO: 0.2 %
KETONES UR QL STRIP.AUTO: NEGATIVE
LEUKOCYTE ESTERASE UR QL STRIP.AUTO: ABNORMAL
LYMPHOCYTES # BLD AUTO: 1.58 X10(3)/MCL (ref 0.6–4.6)
LYMPHOCYTES NFR BLD AUTO: 31.3 %
MCH RBC QN AUTO: 31.2 PG (ref 27–31)
MCHC RBC AUTO-ENTMCNC: 32.8 G/DL (ref 33–36)
MCV RBC AUTO: 95.3 FL (ref 80–94)
MONOCYTES # BLD AUTO: 0.26 X10(3)/MCL (ref 0.1–1.3)
MONOCYTES NFR BLD AUTO: 5.2 %
NEUTROPHILS # BLD AUTO: 3.06 X10(3)/MCL (ref 2.1–9.2)
NEUTROPHILS NFR BLD AUTO: 60.7 %
NITRITE UR QL STRIP.AUTO: NEGATIVE
NRBC BLD AUTO-RTO: 0 %
PH UR STRIP.AUTO: 5.5 [PH]
PLATELET # BLD AUTO: 203 X10(3)/MCL (ref 130–400)
PMV BLD AUTO: 11 FL (ref 7.4–10.4)
POTASSIUM SERPL-SCNC: 3.9 MMOL/L (ref 3.5–5.1)
PROT SERPL-MCNC: 7.8 GM/DL (ref 6.4–8.3)
PROT UR QL STRIP.AUTO: NEGATIVE
RBC # BLD AUTO: 4.45 X10(6)/MCL (ref 4.2–5.4)
RBC #/AREA URNS AUTO: ABNORMAL /HPF
RBC UR QL AUTO: ABNORMAL
SODIUM SERPL-SCNC: 141 MMOL/L (ref 136–145)
SP GR UR STRIP.AUTO: >=1.03 (ref 1–1.03)
SQUAMOUS #/AREA URNS AUTO: ABNORMAL /HPF
TESTOST SERPL-MCNC: 34.18 NG/DL (ref 12.4–35.75)
TSH SERPL-ACNC: 1.15 UIU/ML (ref 0.35–4.94)
UROBILINOGEN UR STRIP-ACNC: 0.2
WBC # SPEC AUTO: 5.04 X10(3)/MCL (ref 4.5–11.5)
WBC #/AREA URNS AUTO: ABNORMAL /HPF

## 2023-10-20 PROCEDURE — 3078F DIAST BP <80 MM HG: CPT | Mod: CPTII,,, | Performed by: NURSE PRACTITIONER

## 2023-10-20 PROCEDURE — 86039 ANTINUCLEAR ANTIBODIES (ANA): CPT

## 2023-10-20 PROCEDURE — 80053 COMPREHEN METABOLIC PANEL: CPT

## 2023-10-20 PROCEDURE — 85025 COMPLETE CBC W/AUTO DIFF WBC: CPT

## 2023-10-20 PROCEDURE — 1159F MED LIST DOCD IN RCRD: CPT | Mod: CPTII,,, | Performed by: NURSE PRACTITIONER

## 2023-10-20 PROCEDURE — 99213 PR OFFICE/OUTPT VISIT, EST, LEVL III, 20-29 MIN: ICD-10-PCS | Mod: ,,, | Performed by: NURSE PRACTITIONER

## 2023-10-20 PROCEDURE — 3008F BODY MASS INDEX DOCD: CPT | Mod: CPTII,,, | Performed by: NURSE PRACTITIONER

## 2023-10-20 PROCEDURE — 1160F RVW MEDS BY RX/DR IN RCRD: CPT | Mod: CPTII,,, | Performed by: NURSE PRACTITIONER

## 2023-10-20 PROCEDURE — 3074F SYST BP LT 130 MM HG: CPT | Mod: CPTII,,, | Performed by: NURSE PRACTITIONER

## 2023-10-20 PROCEDURE — 84443 ASSAY THYROID STIM HORMONE: CPT

## 2023-10-20 PROCEDURE — 99213 OFFICE O/P EST LOW 20 MIN: CPT | Mod: ,,, | Performed by: NURSE PRACTITIONER

## 2023-10-20 PROCEDURE — 1159F PR MEDICATION LIST DOCUMENTED IN MEDICAL RECORD: ICD-10-PCS | Mod: CPTII,,, | Performed by: NURSE PRACTITIONER

## 2023-10-20 PROCEDURE — 84403 ASSAY OF TOTAL TESTOSTERONE: CPT

## 2023-10-20 PROCEDURE — 3074F PR MOST RECENT SYSTOLIC BLOOD PRESSURE < 130 MM HG: ICD-10-PCS | Mod: CPTII,,, | Performed by: NURSE PRACTITIONER

## 2023-10-20 PROCEDURE — 3078F PR MOST RECENT DIASTOLIC BLOOD PRESSURE < 80 MM HG: ICD-10-PCS | Mod: CPTII,,, | Performed by: NURSE PRACTITIONER

## 2023-10-20 PROCEDURE — 3008F PR BODY MASS INDEX (BMI) DOCUMENTED: ICD-10-PCS | Mod: CPTII,,, | Performed by: NURSE PRACTITIONER

## 2023-10-20 PROCEDURE — 36415 COLL VENOUS BLD VENIPUNCTURE: CPT

## 2023-10-20 PROCEDURE — 1160F PR REVIEW ALL MEDS BY PRESCRIBER/CLIN PHARMACIST DOCUMENTED: ICD-10-PCS | Mod: CPTII,,, | Performed by: NURSE PRACTITIONER

## 2023-10-20 NOTE — PROGRESS NOTES
Please inform patient of results.    1. Ua shows blood, bacteria but also skin cells.  Is she having any uti symptoms? If yes, call lab to add culture. Encourage fluids. Contact clinic for concerns.     Other labwork within acceptable ranges.

## 2023-10-20 NOTE — PROGRESS NOTES
"Subjective:      Patient ID: Lida Walker is a 53 y.o. White female      Chief Complaint: Fatigue and Hair Loss       Past Medical History:   Diagnosis Date    Depressive disorder 6/28/2022    Fibromyalgia     Obesity 6/28/2022    SAMMY on CPAP 1/5/2023    Personal history of colonic polyps 01/07/2021    Vitamin D deficiency 6/28/2022        HPI  Fatigue  This is a new problem. Episode onset: "several months" Associated symptoms include headaches. Pertinent negatives include no chest pain, chills or fever. Associated symptoms comments: Rash on face. She has tried nothing for the symptoms.     States depression symptoms stable.   Denies any ABD pain, nausea, vomiting, unintentional weight loss, bloody stools, dizziness, syncope, problems with speech/coordination, CP, or SOB.      C/O of hair loss/thinning for a few months.  Denies any patchy alopecia or scarring.  Denies any other problems.      Patient Care Team:  Skylar Tejeda MD as PCP - General (Family Medicine)  Mike Pollard MD as Consulting Physician (Gastroenterology)      Review of Systems   Constitutional: Negative.  Negative for appetite change, chills and fever.   HENT: Negative.          Hair loss   Eyes: Negative.  Negative for discharge and redness.   Respiratory: Negative.  Negative for shortness of breath.    Cardiovascular: Negative.  Negative for chest pain.   Gastrointestinal: Negative.    Endocrine: Negative.    Genitourinary: Negative.    Integumentary:  Negative.   Allergic/Immunologic: Negative.    Neurological:  Positive for headaches.   Psychiatric/Behavioral: Negative.     All other systems reviewed and are negative.          Objective:      Vitals:    10/20/23 0803   BP: 120/72   Pulse: 65   Resp: 18   Temp: 98.7 °F (37.1 °C)      Body mass index is 30.12 kg/m².     Physical Exam  Vitals reviewed.   Constitutional:       Appearance: Normal appearance.   HENT:      Head: Normocephalic.      Mouth/Throat:      Mouth: Mucous " membranes are moist.   Eyes:      Conjunctiva/sclera: Conjunctivae normal.      Pupils: Pupils are equal, round, and reactive to light.   Cardiovascular:      Rate and Rhythm: Normal rate and regular rhythm.   Pulmonary:      Effort: Pulmonary effort is normal. No respiratory distress.      Breath sounds: Normal breath sounds.   Musculoskeletal:         General: Normal range of motion.      Cervical back: Normal range of motion and neck supple.   Skin:     General: Skin is warm and dry.   Neurological:      Mental Status: She is alert and oriented to person, place, and time.   Psychiatric:         Mood and Affect: Mood normal.            Current Outpatient Medications:     cholecalciferol, vitamin D3, 1,250 mcg (50,000 unit) capsule, Take 50,000 Units by mouth every 7 days., Disp: , Rfl:     FLUoxetine 10 MG capsule, Take 1 capsule (10 mg total) by mouth once daily. To take with 40mg daily for a total of 50mg prozac daily, Disp: 90 capsule, Rfl: 3    FLUoxetine 40 MG capsule, Take 1 capsule (40 mg total) by mouth once daily., Disp: 90 capsule, Rfl: 3    fluticasone propionate (FLONASE) 50 mcg/actuation nasal spray, USE 1 SPRAY INTO EACH NOSTRIL TWICE A DAY, Disp: 48 mL, Rfl: 1    methylPREDNISolone (MEDROL DOSEPACK) 4 mg tablet, use as directed, Disp: 1 each, Rfl: 0    acetaminophen (TYLENOL EXTRA STRENGTH) 500 MG tablet, Take 500 mg by mouth as needed for Pain., Disp: , Rfl:     diclofenac (VOLTAREN) 75 MG EC tablet, Take 1 tablet (75 mg total) by mouth 2 (two) times daily with meals. (Patient not taking: Reported on 10/20/2023), Disp: 60 tablet, Rfl: 11    gabapentin (NEURONTIN) 300 MG capsule, Take 1 capsule (300 mg total) by mouth every evening., Disp: 30 capsule, Rfl: 8    naproxen sodium (ALEVE ORAL), Take by mouth as needed. As needed for Headaches. Will rotate with Tylenol., Disp: , Rfl:     triamcinolone acetonide 0.1% (KENALOG) 0.1 % cream, Apply topically 2 (two) times daily. for 10 days (Patient not  taking: Reported on 10/20/2023), Disp: 80 g, Rfl: 2    Assessment & Plan:     Problem List Items Addressed This Visit          Derm    Hair loss     Labs today  Will call with results  Declines compound medication  Instructed to start OTC multivitamins  F/U with Derm if no improvement  Verbalizes understanding         Relevant Orders    CBC Auto Differential    Comprehensive Metabolic Panel    TSH    Testosterone    NNEKA IgG by IFA       Other    Fatigue - Primary     Labs today  Will call with results  Instructed to start OTC multivitamin  F/U if no improvement         Relevant Orders    CBC Auto Differential    Comprehensive Metabolic Panel    TSH    Testosterone    NNEKA IgG by IFA

## 2023-10-21 LAB — ANA SER QL HEP2 SUBST: NORMAL

## 2023-11-07 ENCOUNTER — OFFICE VISIT (OUTPATIENT)
Dept: HEMATOLOGY/ONCOLOGY | Facility: CLINIC | Age: 53
End: 2023-11-07
Payer: COMMERCIAL

## 2023-11-07 DIAGNOSIS — Z80.3 FAMILY HISTORY OF BREAST CANCER: ICD-10-CM

## 2023-11-07 DIAGNOSIS — Z91.89 AT HIGH RISK FOR BREAST CANCER: ICD-10-CM

## 2023-11-07 PROCEDURE — 99205 PR OFFICE/OUTPT VISIT, NEW, LEVL V, 60-74 MIN: ICD-10-PCS | Mod: 95,,, | Performed by: NURSE PRACTITIONER

## 2023-11-07 PROCEDURE — 1159F PR MEDICATION LIST DOCUMENTED IN MEDICAL RECORD: ICD-10-PCS | Mod: CPTII,95,, | Performed by: NURSE PRACTITIONER

## 2023-11-07 PROCEDURE — 1159F MED LIST DOCD IN RCRD: CPT | Mod: CPTII,95,, | Performed by: NURSE PRACTITIONER

## 2023-11-07 PROCEDURE — 99205 OFFICE O/P NEW HI 60 MIN: CPT | Mod: 95,,, | Performed by: NURSE PRACTITIONER

## 2023-11-07 NOTE — PROGRESS NOTES
"REFERRING PROVIDER:    Subjective:       Patient ID: Lida Walker is a 53 y.o. female.    Chief Complaint: No personal history of cancer, but a family history of cancer, and a known BRCA2 mutation in her mother. Patient presented via Telemedicine Visit (audio and video) today for risk assessment, genetic counseling, and consideration for genetic testing.    HPI  Past Medical History:   Diagnosis Date    Depressive disorder 6/28/2022    Fibromyalgia     Obesity 6/28/2022    SAMMY on CPAP 1/5/2023    Personal history of colonic polyps 01/07/2021    Vitamin D deficiency 6/28/2022        Past Surgical History:   Procedure Laterality Date    Bladder lift      CHOLECYSTECTOMY      COLONOSCOPY  01/07/2021    HYSTERECTOMY          Review of patient's allergies indicates:  Lorcet (propoxyphene) and Lortab [hydrocodone-acetaminophen]     Review of Systems      Oncology History    No history exists.      Family History   Problem Relation Age of Onset    Breast cancer Mother 78    Throat cancer Mother 71    Hypertension Mother     BRCA2 Positive Mother     Skin cancer Mother     Atrial fibrillation Father     Colon cancer Brother 56    Cancer Maternal Grandmother 30 - 40        "Female" cancer    Hypertension Paternal Grandmother     Cancer Paternal Grandfather     Breast cancer Other 36    BRCA2 Positive Other     Thyroid cancer Maternal Uncle 73    Thyroid cancer Paternal Uncle     Skin cancer Paternal Uncle     Prostate cancer Paternal Uncle           Assessment:   Risk Assessment:  This patient is at 50% risk of having the BRCA2 mutation known to be in her family. Patient meets criteria for genetic testing based on the National Comprehensive Cancer Network (NCCN) criteria due to a known BRCA2 mutation in the family  (see family history and pedigree). Based on the likelihood of having a mutation, BRCA2 specific site testing through [facility] was described in detail.    Education and Counseling:  Risks of " cancer associated with inherited cancer predisposition mutations were discussed in detail.  If a mutation were found, this patient would have a significantly increased risk for cancer.  It has been shown that individuals with a BRCA1 or BRCA2 mutation face a 56-87% lifetime risk of breast cancer and a 27-44% lifetime risk of ovarian cancer. Mutation carriers who have had breast cancer have a 20% (BRCA1) or 12% (BRCA2) chance of developing a second breast cancer within 5 years, and up to 64% (BRCA1) or 52% (BRCA2) of a second breast cancer by age 70. Mutation carriers have up to a 5% risk of pancreatic cancer, as well as increased risk for other cancers such as colon cancer and lymphoma. Other inherited cancer syndromes that are also included in the myRisk test, may have different, but still significant risk for cancer.    The availability of clinical management options for inherited cancer predisposition mutation carriers was discussed, including increased surveillance, chemo prevention, and prophylactic surgery. Details of the testing process, including benefits and limitations of genetic analysis as well as the implications of possible test results, were discussed.  Specific site testing was presented.  Related insurance issues were discussed.      Summary:  This patient was evaluated for hereditary risk of cancer and was found to be at an increased risk of having an inherited cancer predisposition mutation.  The option of genetic testing was explained in detail, including the possible impact of this information on family members.  Since this patient wishes to proceed with testing an informed consent will be obtained and blood drawn and sent to [facility].  Results will be expected 4 weeks from this time.  A follow-up appointment will be scheduled for results disclosure.        The patient location is: home    Visit type: audiovisual    Face to Face time with patient: 40 minutes  >60 minutes of  total time spent on the encounter, which includes face to face time and non-face to face time preparing to see the patient (eg, review of tests), Obtaining and/or reviewing separately obtained history, Documenting clinical information in the electronic or other health record, Independently interpreting results (not separately reported) and communicating results to the patient/family/caregiver, or Care coordination (not separately reported).         Each patient to whom he or she provides medical services by telemedicine is:  (1) informed of the relationship between the physician and patient and the respective role of any other health care provider with respect to management of the patient; and (2) notified that he or she may decline to receive medical services by telemedicine and may withdraw from such care at any time.      GREGG WOODS, PhD     Answers submitted by the patient for this visit:  Review of Systems Questionnaire (Submitted on 11/7/2023)  appetite change : No  unexpected weight change: No  mouth sores: No  visual disturbance: No  cough: No  shortness of breath: No  chest pain: Yes  abdominal pain: No  diarrhea: No  frequency: No  back pain: No  rash: Yes  headaches: Yes  adenopathy: No  nervous/ anxious: Yes

## 2023-11-15 ENCOUNTER — LAB VISIT (OUTPATIENT)
Dept: LAB | Facility: HOSPITAL | Age: 53
End: 2023-11-15
Attending: NURSE PRACTITIONER
Payer: COMMERCIAL

## 2023-11-15 DIAGNOSIS — Z91.89 AT HIGH RISK FOR BREAST CANCER: ICD-10-CM

## 2023-11-15 DIAGNOSIS — Z80.3 FAMILY HISTORY OF BREAST CANCER: ICD-10-CM

## 2023-11-15 PROCEDURE — 36415 COLL VENOUS BLD VENIPUNCTURE: CPT

## 2023-12-06 ENCOUNTER — OFFICE VISIT (OUTPATIENT)
Dept: HEMATOLOGY/ONCOLOGY | Facility: CLINIC | Age: 53
End: 2023-12-06
Payer: COMMERCIAL

## 2023-12-06 DIAGNOSIS — Z80.3 FAMILY HISTORY OF BREAST CANCER: Primary | ICD-10-CM

## 2023-12-06 PROCEDURE — 99213 OFFICE O/P EST LOW 20 MIN: CPT | Mod: 95,,, | Performed by: NURSE PRACTITIONER

## 2023-12-06 PROCEDURE — 99213 PR OFFICE/OUTPT VISIT, EST, LEVL III, 20-29 MIN: ICD-10-PCS | Mod: 95,,, | Performed by: NURSE PRACTITIONER

## 2023-12-06 NOTE — PROGRESS NOTES
REFERRING PHYSICIAN: RICHARD Lawrence    Subjective:       Patient ID: Lida Walker is a 53 y.o. female.    Chief Complaint: No personal history of cancer, but a family history of cancer, and a known BRCA2 mutation in her mother, Ruthann Olivo. Patient presented for genetic counseling on 11/7/2023 and was found appropriate for genetic testing based on the National Comprehensive Cancer Network (NCCN) criteria due to a known BRCA2 mutation in the family  (see family history and pedigree). She signed consent, lab was drawn and sent to [facility] for BRCA2 [id number] specific site testing. She presented via Telemedicine Visit (audio and video) today for results disclosure.     HPI    Past Medical History:   Diagnosis Date    Depressive disorder 6/28/2022    Fibromyalgia     Obesity 6/28/2022    SAMMY on CPAP 1/5/2023    Personal history of colonic polyps 01/07/2021    Vitamin D deficiency 6/28/2022        Past Surgical History:   Procedure Laterality Date    Bladder lift      CHOLECYSTECTOMY      COLONOSCOPY  01/07/2021    HYSTERECTOMY          Review of patient's allergies indicates:   Allergen Reactions    Lorcet (propoxyphene)     Lortab [hydrocodone-acetaminophen]         Review of Systems   Constitutional:  Negative for appetite change and unexpected weight change.   HENT:  Negative for mouth sores.    Eyes:  Negative for visual disturbance.   Respiratory:  Negative for cough and shortness of breath.    Cardiovascular:  Negative for chest pain.   Gastrointestinal:  Negative for abdominal pain and diarrhea.   Genitourinary:  Negative for frequency.   Musculoskeletal:  Negative for back pain.   Integumentary:  Negative for rash.   Neurological:  Negative for headaches.   Hematological:  Negative for adenopathy.   Psychiatric/Behavioral:  The patient is nervous/anxious.            Problem List Items Addressed This Visit    None    Oncology History    No history exists.            Family History  "  Problem Relation Age of Onset    Breast cancer Mother 78    Throat cancer Mother 71    Hypertension Mother     BRCA2 Positive Mother     Skin cancer Mother     Atrial fibrillation Father     Colon cancer Brother 56    Cancer Maternal Grandmother 30 - 40        "Female" cancer    Hypertension Paternal Grandmother     Cancer Paternal Grandfather     Breast cancer Other 36    BRCA2 Positive Other     Thyroid cancer Maternal Uncle 73    Thyroid cancer Paternal Uncle     Skin cancer Paternal Uncle     Prostate cancer Paternal Uncle         Assessment:   Genetic testing was appropriate for this patient because of the family history of a known BRCA2 mutation. This single site analysis indicated that there was no deleterious mutation found. It was explained to the patient, that this analysis was only to test for the mutation known to be in her family. It was also explained that there is no need to test her children since they could not have inherited it from her.     A copy of her result will be emailed to [email or web address]     The patient location is: home    Visit type: audiovisual    Face to Face time with patient: 10 minutes  20 minutes of total time spent on the encounter, which includes face to face time and non-face to face time preparing to see the patient (eg, review of tests), Obtaining and/or reviewing separately obtained history, Documenting clinical information in the electronic or other health record, Independently interpreting results (not separately reported) and communicating results to the patient/family/caregiver, or Care coordination (not separately reported).         Each patient to whom he or she provides medical services by telemedicine is:  (1) informed of the relationship between the physician and patient and the respective role of any other health care provider with respect to management of the patient; and (2) notified that he or she may decline to receive medical services " by telemedicine and may withdraw from such care at any time.    GREGG WOODS, PhD

## 2024-04-01 RX ORDER — FLUOXETINE HYDROCHLORIDE 40 MG/1
CAPSULE ORAL
Qty: 90 CAPSULE | Refills: 0 | Status: SHIPPED | OUTPATIENT
Start: 2024-04-01

## 2024-04-01 RX ORDER — FLUOXETINE 10 MG/1
CAPSULE ORAL
Qty: 90 CAPSULE | Refills: 0 | Status: SHIPPED | OUTPATIENT
Start: 2024-04-01

## 2024-06-28 ENCOUNTER — APPOINTMENT (OUTPATIENT)
Dept: RADIOLOGY | Facility: HOSPITAL | Age: 54
End: 2024-06-28
Payer: COMMERCIAL

## 2024-06-28 DIAGNOSIS — Z80.3 FAMILY HISTORY OF BREAST CANCER: ICD-10-CM

## 2024-06-28 DIAGNOSIS — Z91.89 AT HIGH RISK FOR BREAST CANCER: ICD-10-CM

## 2024-06-28 PROCEDURE — 77049 MRI BREAST C-+ W/CAD BI: CPT | Mod: TC

## 2024-06-28 PROCEDURE — 25500020 PHARM REV CODE 255

## 2024-06-28 PROCEDURE — A9577 INJ MULTIHANCE: HCPCS

## 2024-06-28 PROCEDURE — 77049 MRI BREAST C-+ W/CAD BI: CPT | Mod: 26,,, | Performed by: RADIOLOGY

## 2024-06-28 RX ADMIN — GADOBENATE DIMEGLUMINE 15 ML: 529 INJECTION, SOLUTION INTRAVENOUS at 01:06

## 2024-07-01 RX ORDER — FLUOXETINE 10 MG/1
CAPSULE ORAL
Qty: 90 CAPSULE | Refills: 0 | Status: SHIPPED | OUTPATIENT
Start: 2024-07-01

## 2024-07-01 NOTE — TELEPHONE ENCOUNTER
Patient was due for wellness visit 1/2024.  Lov 10/2023 with benny.  No appts scheduled in chart. Please schedule wellness with labs.  Needs to be seen before further refills given.      I have signed for the following orders AND/OR meds.  Please call the patient and ask the patient to schedule the testing AND/OR inform about any medications that were sent.        Medications Ordered This Encounter   Medications    FLUoxetine 10 MG capsule     Sig: TAKE 1 CAPSULE BY MOUTH EVERY DAY WITH 40MG CAPSULE     Dispense:  90 capsule     Refill:  0

## 2024-08-02 RX ORDER — FLUOXETINE HYDROCHLORIDE 40 MG/1
CAPSULE ORAL
Qty: 90 CAPSULE | Refills: 0 | Status: SHIPPED | OUTPATIENT
Start: 2024-08-02

## 2024-08-21 ENCOUNTER — HOSPITAL ENCOUNTER (EMERGENCY)
Facility: HOSPITAL | Age: 54
Discharge: HOME | End: 2024-08-21
Attending: FAMILY MEDICINE
Payer: COMMERCIAL

## 2024-08-21 VITALS
RESPIRATION RATE: 18 BRPM | HEART RATE: 86 BPM | WEIGHT: 175 LBS | BODY MASS INDEX: 29.88 KG/M2 | DIASTOLIC BLOOD PRESSURE: 91 MMHG | TEMPERATURE: 98 F | OXYGEN SATURATION: 98 % | SYSTOLIC BLOOD PRESSURE: 147 MMHG | HEIGHT: 64 IN

## 2024-08-21 DIAGNOSIS — R07.9 CHEST PAIN: ICD-10-CM
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
## 2024-08-21 DIAGNOSIS — S20.219A CONTUSION OF CHEST WALL, UNSPECIFIED LATERALITY, INITIAL ENCOUNTER: Primary | ICD-10-CM
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
## 2024-08-21 PROCEDURE — 96372 THER/PROPH/DIAG INJ SC/IM: CPT | Performed by: FAMILY MEDICINE

## 2024-08-21 PROCEDURE — 99284 EMERGENCY DEPT VISIT MOD MDM: CPT | Mod: 25

## 2024-08-21 PROCEDURE — 63600175 PHARM REV CODE 636 W HCPCS: Performed by: FAMILY MEDICINE

## 2024-08-21 PROCEDURE — 25000003 PHARM REV CODE 250: Performed by: FAMILY MEDICINE

## 2024-08-21 PROCEDURE — 93005 ELECTROCARDIOGRAM TRACING: CPT

## 2024-08-21 RX ORDER — ONDANSETRON 4 MG/1
4 TABLET, ORALLY DISINTEGRATING ORAL
Status: COMPLETED | OUTPATIENT
Start: 2024-08-21 | End: 2024-08-21

## 2024-08-21 RX ORDER — MEPERIDINE HYDROCHLORIDE 25 MG/ML
25 INJECTION INTRAMUSCULAR; INTRAVENOUS; SUBCUTANEOUS
Status: COMPLETED | OUTPATIENT
Start: 2024-08-21 | End: 2024-08-21

## 2024-08-21 RX ORDER — TRAMADOL HYDROCHLORIDE 50 MG/1
50 TABLET ORAL EVERY 6 HOURS PRN
Qty: 12 TABLET | Refills: 0 | Status: SHIPPED | OUTPATIENT
Start: 2024-08-21

## 2024-08-21 RX ADMIN — MEPERIDINE HYDROCHLORIDE 25 MG: 25 INJECTION INTRAMUSCULAR; INTRAVENOUS; SUBCUTANEOUS at 06:08

## 2024-08-21 RX ADMIN — ONDANSETRON 4 MG: 4 TABLET, ORALLY DISINTEGRATING ORAL at 06:08

## 2024-08-21 NOTE — Clinical Note
"Lida Cannon" Aaron was seen and treated in our emergency department on 8/21/2024.  She may return to work on 08/23/2024.       If you have any questions or concerns, please don't hesitate to call.       RN    "
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
## 2024-08-22 LAB
OHS QRS DURATION: 88 MS
OHS QTC CALCULATION: 422 MS

## 2024-10-28 RX ORDER — FLUOXETINE HYDROCHLORIDE 40 MG/1
CAPSULE ORAL
Qty: 90 CAPSULE | Refills: 0 | Status: SHIPPED | OUTPATIENT
Start: 2024-10-28

## 2025-04-29 DIAGNOSIS — N63.24 UNSPECIFIED LUMP IN THE LEFT BREAST, LOWER INNER QUADRANT: Primary | ICD-10-CM

## 2025-08-18 ENCOUNTER — TELEPHONE (OUTPATIENT)
Dept: FAMILY MEDICINE | Facility: CLINIC | Age: 55
End: 2025-08-18
Payer: COMMERCIAL